# Patient Record
Sex: FEMALE | Race: BLACK OR AFRICAN AMERICAN | Employment: FULL TIME | URBAN - METROPOLITAN AREA
[De-identification: names, ages, dates, MRNs, and addresses within clinical notes are randomized per-mention and may not be internally consistent; named-entity substitution may affect disease eponyms.]

---

## 2017-01-10 ENCOUNTER — OFFICE VISIT (OUTPATIENT)
Dept: INTERNAL MEDICINE CLINIC | Age: 47
End: 2017-01-10

## 2017-01-10 VITALS
WEIGHT: 210 LBS | BODY MASS INDEX: 33.75 KG/M2 | SYSTOLIC BLOOD PRESSURE: 144 MMHG | DIASTOLIC BLOOD PRESSURE: 83 MMHG | TEMPERATURE: 98.2 F | OXYGEN SATURATION: 99 % | HEART RATE: 74 BPM | HEIGHT: 66 IN | RESPIRATION RATE: 18 BRPM

## 2017-01-10 DIAGNOSIS — I10 ESSENTIAL HYPERTENSION: ICD-10-CM

## 2017-01-10 DIAGNOSIS — M79.641 PAIN OF RIGHT HAND: Primary | ICD-10-CM

## 2017-01-10 RX ORDER — NAPROXEN 500 MG/1
500 TABLET ORAL 2 TIMES DAILY WITH MEALS
Qty: 60 TAB | Refills: 0 | Status: SHIPPED | OUTPATIENT
Start: 2017-01-10 | End: 2017-05-15

## 2017-01-10 NOTE — LETTER
1/10/2017 2:09 PM 
 
Ms. Cesar KUMAR [address] [facility] [phone] To Whom It May Concern: 
 
 
Please excuse the above patient from work January 10-11, 2017 due to acute hand injury. Please contact our office if further information is needed. Sincerely, Benito Dorman MD

## 2017-01-10 NOTE — MR AVS SNAPSHOT
Visit Information Date & Time Provider Department Dept. Phone Encounter #  
 1/10/2017  1:45 PM [address] [phone] Follow-up Instructions Return in about 3 months (around 4/10/2017), or if symptoms worsen or fail to improve, for hypertension. Upcoming Health Maintenance Date Due Pneumococcal 19-64 Highest Risk (1 of 3 - PCV13) 2/4/1989 DTaP/Tdap/Td series (1 - Tdap) 2/4/1991 INFLUENZA AGE 9 TO ADULT 8/1/2016 PAP AKA CERVICAL CYTOLOGY 11/6/2016 Allergies as of 1/10/2017  Review Complete On: 1/10/2017 By: Radha Schwartz LPN Severity Noted Reaction Type Reactions Codeine  12/12/2011    Nausea and Vomiting Current Immunizations  Never Reviewed No immunizations on file. Not reviewed this visit You Were Diagnosed With   
  
 Codes Comments Pain of right hand    -  Primary ICD-10-CM: M79.641 ICD-9-CM: 729.5 Essential hypertension     ICD-10-CM: I10 
ICD-9-CM: 401.9 Vitals BP Pulse Temp Resp Height(growth percentile) Weight(growth percentile) 144/83 74 98.2 °F (36.8 °C) (Oral) 18 5' 6\" (1.676 m) 210 lb (95.3 kg) SpO2 BMI OB Status Smoking Status 99% 33.89 kg/m2 Chemically Induced Never Smoker BMI and BSA Data Body Mass Index Body Surface Area  
 33.89 kg/m 2 2.11 m 2 Preferred Pharmacy Pharmacy Name Phone [facility], [address] [place] [address] [phone] Your Updated Medication List  
  
   
This list is accurate as of: 1/10/17  2:11 PM.  Always use your most recent med list.  
  
  
  
  
 albuterol 90 mcg/actuation inhaler Commonly known as:  VENTOLIN HFA  
inhale 1 puff by mouth BEFORE EXERCISING  
  
 calcium carbonate 600 mg (1,500 mg) tablet Commonly known as:  Malissa Doom Take 600 mg by mouth two (2) times a day. cholecalciferol (VITAMIN D3) 5,000 unit Tab tablet Commonly known as:  VITAMIN D3 Take  by mouth daily. cyclobenzaprine 10 mg tablet Commonly known as:  FLEXERIL Take 1 Tab by mouth two (2) times a day. diclofenac EC 75 mg EC tablet Commonly known as:  VOLTAREN Take 1 Tab by mouth two (2) times a day. ferrous sulfate 325 mg (65 mg iron) tablet Commonly known as:  Alvina Carbo 1qd  
  
 fluticasone 110 mcg/actuation inhaler Commonly known as:  FLOVENT HFA Take 2 Puffs by inhalation every twelve (12) hours. naproxen 500 mg tablet Commonly known as:  NAPROSYN Take 1 Tab by mouth two (2) times daily (with meals). ORTHO-NOVUM 7/7/7 (28) 0.5/0.75/1 mg- 35 mcg Tab Generic drug:  norethindrone-ethinyl estradiol Take 1 Tab by mouth. * SYNTHROID 150 mcg tablet Generic drug:  levothyroxine Take 150 mcg by mouth Daily (before breakfast). Take 1 tablet by mouth form Monday to Friday and 2 tablets by mouth on Saturdays and Sundays * SYNTHROID 175 mcg tablet Generic drug:  levothyroxine Take 175 mcg by mouth Daily (before breakfast). triamcinolone acetonide 0.1 % topical cream  
Commonly known as:  KENALOG  
use thin layer to rash neck bid  
  
 valsartan-hydroCHLOROthiazide 320-12.5 mg per tablet Commonly known as:  DIOVAN-HCT Take 1 Tab by mouth daily. * Notice: This list has 2 medication(s) that are the same as other medications prescribed for you. Read the directions carefully, and ask your doctor or other care provider to review them with you. Prescriptions Sent to Pharmacy Refills  
 naproxen (NAPROSYN) 500 mg tablet 0 Sig: Take 1 Tab by mouth two (2) times daily (with meals). Class: Normal  
 Pharmacy: [facility] [address], [address] [place] [address] #: [phone] Route: Oral  
  
Follow-up Instructions Return in about 3 months (around 4/10/2017), or if symptoms worsen or fail to improve, for hypertension. Patient Instructions Hand Pain: Care Instructions Your Care Instructions Common causes of hand pain are overuse and injuries, such as might happen during sports or home repair projects. Everyday wear and tear, especially as you get older, also can cause hand pain. Most minor hand injuries will heal on their own, and home treatment is usually all you need to do. If you have sudden and severe pain, you may need tests and treatment. Follow-up care is a key part of your treatment and safety. Be sure to make and go to all appointments, and call your doctor if you are having problems. Its also a good idea to know your test results and keep a list of the medicines you take. How can you care for yourself at home? · Take pain medicines exactly as directed. ¨ If the doctor gave you a prescription medicine for pain, take it as prescribed. ¨ If you are not taking a prescription pain medicine, ask your doctor if you can take an over-the-counter medicine. · Rest and protect your hand. Take a break from any activity that may cause pain. · Put ice or a cold pack on your hand for 10 to 20 minutes at a time. Put a thin cloth between the ice and your skin. · Prop up the sore hand on a pillow when you ice it or anytime you sit or lie down during the next 3 days. Try to keep it above the level of your heart. This will help reduce swelling. · If your doctor recommends a sling, splint, or elastic bandage to support your hand, wear it as directed. When should you call for help? Call 911 anytime you think you may need emergency care. For example, call if: 
· Your hand turns cool or pale or changes color. Call your doctor now or seek immediate medical care if: 
· You cannot move your hand. · Your hand pops, moves out of its normal position, and then returns to its normal position. · You have signs of infection, such as: 
¨ Increased pain, swelling, warmth, or redness. ¨ Red streaks leading from the sore area. ¨ Pus draining from a place on your hand. ¨ A fever. · Your hand feels numb or tingly. Watch closely for changes in your health, and be sure to contact your doctor if: 
· Your hand feels unstable when you try to use it. · You do not get better as expected. · You have any new symptoms, such as swelling. · Bruises from an injury to your hand last longer than 2 weeks. Where can you learn more? Go to http://matthew-mitch.info/. Enter R273 in the search box to learn more about \"Hand Pain: Care Instructions. \" Current as of: May 27, 2016 Content Version: 11.1 © 4499-9997 Forerun. Care instructions adapted under license by [employer] (which disclaims liability or warranty for this information). If you have questions about a medical condition or this instruction, always ask your healthcare professional. [address] any warranty or liability for your use of this information. Introducing [facility] & HEALTH SERVICES! Dear Jero Rayogza: Thank you for requesting a Pumant account. Our records indicate that you already have an active Pumant account. You can access your account anytime at [email or web address] Did you know that you can access your hospital and ER discharge instructions at any time in Pumant? You can also review all of your test results from your hospital stay or ER visit. Additional Information If you have questions, please visit the Frequently Asked Questions section of the Pumant website at [email or web address] Remember, Pumant is NOT to be used for urgent needs. For medical emergencies, dial 911. Now available from your iPhone and Android! Please provide this summary of care documentation to your next provider. Your primary care clinician is listed as Catherine Dean. If you have any questions after today's visit, please call [phone].

## 2017-01-10 NOTE — PROGRESS NOTES
ROOM # 16    Pt presents today for complaint of Right middle finger pain s/p slip and fall on black ice. Pt preferred language for health care discussion is english. Is someone accompanying this pt? no    Is the patient using any DME equipment during OV? no    Depression Screening completed. Yes    Learning Assessment completed. Yes    Abuse Screening completed. Yes    Health Maintenance reviewed and discussed per provider. Yes    Pt is due for  Pap, Tdap, Flu, PCV-13. Please order/place referral if appropriate. Advance Directive:  1. Do you have an advance directive in place? Patient Reply: No    2. If not, would you like material regarding how to put one in place? Patient Reply: No    Coordination of Care:  1. Have you been to the ER, urgent care clinic since your last visit? Hospitalized since your last visit? No    2. Have you seen or consulted any other health care providers outside of the [address] [place] [address] since your last visit? Include any pap smears or colon screening.  No

## 2017-01-10 NOTE — PROGRESS NOTES
HISTORY OF PRESENT ILLNESS  Gena Lopez is a 55 y.o. female. Fall   The history is provided by the patient. The accident occurred 6 to 12 hours ago. The fall occurred while walking. She fell from a height of 1 - 2 ft. There was no blood loss. The pain is mild. She was ambulatory at the scene. Pertinent negatives include no numbness, no nausea, no vomiting, no headaches, no loss of consciousness, no tingling and no laceration. The risk factors include none. The symptoms are aggravated by activity and use of injured limb. She has tried ice for the symptoms. The treatment provided mild relief. Finger Pain   Pertinent negatives include no chest pain, no headaches and no shortness of breath. Pt slipped on black ice while walking today. Fell back to R hand. Hyperextended thumb. Increased pain with activity such as buttoning. No swelling or bruising. Has tried ice which helped some. BP monitoring at home has been stable. Review of Systems   Eyes: Negative for blurred vision and pain. Respiratory: Negative for cough and shortness of breath. Cardiovascular: Negative for chest pain, palpitations and leg swelling. Gastrointestinal: Negative for heartburn, nausea and vomiting. Genitourinary: Negative for frequency and urgency. Musculoskeletal: Negative for joint pain and myalgias. Neurological: Negative for dizziness, tingling, sensory change, focal weakness, loss of consciousness, numbness and headaches. Psychiatric/Behavioral: Negative for depression. The patient is not nervous/anxious. Physical Exam   Constitutional: She appears well-developed and well-nourished. No distress. /83  Pulse 74  Temp 98.2 °F (36.8 °C) (Oral)   Resp 18  Ht 5' 6\" (1.676 m)  Wt 210 lb (95.3 kg)  SpO2 99%  BMI 33.89 kg/m2     Eyes: EOM are normal. Right eye exhibits no discharge. Left eye exhibits no discharge. No scleral icterus. Musculoskeletal: She exhibits no edema or tenderness.         Right wrist: She exhibits normal range of motion, no bony tenderness and no swelling. Increased pain with extension   Neurological: She is alert. She exhibits normal muscle tone. Skin: Skin is warm and dry. No laceration noted. Psychiatric: She has a normal mood and affect. ASSESSMENT and PLAN    ICD-10-CM ICD-9-CM    1. Pain of right hand M79.641 729.5    2. Essential hypertension I10 401.9    Pain related to hyperextension from fall. No obvious swelling, ecchymosis; doubt fracture. Can take BID Naprosyn (monitor BP). Provided further care instructions on AVS. RTC if sx persist/worsen.

## 2017-01-10 NOTE — PATIENT INSTRUCTIONS
Hand Pain: Care Instructions  Your Care Instructions  Common causes of hand pain are overuse and injuries, such as might happen during sports or home repair projects. Everyday wear and tear, especially as you get older, also can cause hand pain. Most minor hand injuries will heal on their own, and home treatment is usually all you need to do. If you have sudden and severe pain, you may need tests and treatment. Follow-up care is a key part of your treatment and safety. Be sure to make and go to all appointments, and call your doctor if you are having problems. Its also a good idea to know your test results and keep a list of the medicines you take. How can you care for yourself at home? · Take pain medicines exactly as directed. ¨ If the doctor gave you a prescription medicine for pain, take it as prescribed. ¨ If you are not taking a prescription pain medicine, ask your doctor if you can take an over-the-counter medicine. · Rest and protect your hand. Take a break from any activity that may cause pain. · Put ice or a cold pack on your hand for 10 to 20 minutes at a time. Put a thin cloth between the ice and your skin. · Prop up the sore hand on a pillow when you ice it or anytime you sit or lie down during the next 3 days. Try to keep it above the level of your heart. This will help reduce swelling. · If your doctor recommends a sling, splint, or elastic bandage to support your hand, wear it as directed. When should you call for help? Call 911 anytime you think you may need emergency care. For example, call if:  · Your hand turns cool or pale or changes color. Call your doctor now or seek immediate medical care if:  · You cannot move your hand. · Your hand pops, moves out of its normal position, and then returns to its normal position. · You have signs of infection, such as:  ¨ Increased pain, swelling, warmth, or redness. ¨ Red streaks leading from the sore area.   ¨ Pus draining from a place on your hand. ¨ A fever. · Your hand feels numb or tingly. Watch closely for changes in your health, and be sure to contact your doctor if:  · Your hand feels unstable when you try to use it. · You do not get better as expected. · You have any new symptoms, such as swelling. · Bruises from an injury to your hand last longer than 2 weeks. Where can you learn more? Go to http://matthew-mitch.info/. Enter R273 in the search box to learn more about \"Hand Pain: Care Instructions. \"  Current as of: May 27, 2016  Content Version: 11.1  © 0671-3357 [employer] The World. Care instructions adapted under license by [employer] (which disclaims liability or warranty for this information). If you have questions about a medical condition or this instruction, always ask your healthcare professional. [address] any warranty or liability for your use of this information.

## 2017-05-15 ENCOUNTER — OFFICE VISIT (OUTPATIENT)
Dept: INTERNAL MEDICINE CLINIC | Age: 47
End: 2017-05-15

## 2017-05-15 ENCOUNTER — HOSPITAL ENCOUNTER (OUTPATIENT)
Dept: LAB | Age: 47
Discharge: HOME | End: 2017-05-15
Payer: COMMERCIAL

## 2017-05-15 VITALS
RESPIRATION RATE: 20 BRPM | HEIGHT: 66 IN | SYSTOLIC BLOOD PRESSURE: 139 MMHG | BODY MASS INDEX: 33.68 KG/M2 | DIASTOLIC BLOOD PRESSURE: 79 MMHG | TEMPERATURE: 97.5 F | OXYGEN SATURATION: 98 % | HEART RATE: 82 BPM | WEIGHT: 209.6 LBS

## 2017-05-15 DIAGNOSIS — Z00.00 PHYSICAL EXAM: ICD-10-CM

## 2017-05-15 DIAGNOSIS — D50.9 IRON DEFICIENCY ANEMIA, UNSPECIFIED IRON DEFICIENCY ANEMIA TYPE: ICD-10-CM

## 2017-05-15 DIAGNOSIS — I10 ESSENTIAL HYPERTENSION: ICD-10-CM

## 2017-05-15 DIAGNOSIS — Z00.00 PHYSICAL EXAM: Primary | ICD-10-CM

## 2017-05-15 DIAGNOSIS — N39.41 URGE INCONTINENCE: ICD-10-CM

## 2017-05-15 LAB
ALBUMIN SERPL BCP-MCNC: 3.1 G/DL (ref 3.4–5)
ALBUMIN/GLOB SERPL: 0.9 {RATIO} (ref 0.8–1.7)
ALP SERPL-CCNC: 53 U/L (ref 45–117)
ALT SERPL-CCNC: 22 U/L (ref 13–56)
ANION GAP BLD CALC-SCNC: 9 MMOL/L (ref 3–18)
APPEARANCE UR: CLEAR
AST SERPL W P-5'-P-CCNC: 23 U/L (ref 15–37)
BASOPHILS # BLD AUTO: 0 K/UL (ref 0–0.06)
BASOPHILS # BLD: 0 % (ref 0–2)
BILIRUB SERPL-MCNC: 0.3 MG/DL (ref 0.2–1)
BILIRUB UR QL: NEGATIVE
BUN SERPL-MCNC: 6 MG/DL (ref 7–18)
BUN/CREAT SERPL: 7 (ref 12–20)
CALCIUM SERPL-MCNC: 9 MG/DL (ref 8.5–10.1)
CHLORIDE SERPL-SCNC: 104 MMOL/L (ref 100–108)
CHOLEST SERPL-MCNC: 182 MG/DL
CO2 SERPL-SCNC: 25 MMOL/L (ref 21–32)
COLOR UR: YELLOW
CREAT SERPL-MCNC: 0.89 MG/DL (ref 0.6–1.3)
DIFFERENTIAL METHOD BLD: ABNORMAL
EOSINOPHIL # BLD: 0.3 K/UL (ref 0–0.4)
EOSINOPHIL NFR BLD: 4 % (ref 0–5)
ERYTHROCYTE [DISTWIDTH] IN BLOOD BY AUTOMATED COUNT: 14.1 % (ref 11.6–14.5)
GLOBULIN SER CALC-MCNC: 3.4 G/DL (ref 2–4)
GLUCOSE SERPL-MCNC: 91 MG/DL (ref 74–99)
GLUCOSE UR STRIP.AUTO-MCNC: NEGATIVE MG/DL
HCT VFR BLD AUTO: 35.3 % (ref 35–45)
HDLC SERPL-MCNC: 110 MG/DL (ref 40–60)
HDLC SERPL: 1.7 {RATIO} (ref 0–5)
HGB BLD-MCNC: 11.4 G/DL (ref 12–16)
HGB UR QL STRIP: NEGATIVE
IRON SATN MFR SERPL: 30 %
IRON SERPL-MCNC: 113 UG/DL (ref 50–175)
KETONES UR QL STRIP.AUTO: NEGATIVE MG/DL
LDLC SERPL CALC-MCNC: 58.4 MG/DL (ref 0–100)
LEUKOCYTE ESTERASE UR QL STRIP.AUTO: NEGATIVE
LIPID PROFILE,FLP: ABNORMAL
LYMPHOCYTES # BLD AUTO: 32 % (ref 21–52)
LYMPHOCYTES # BLD: 2.3 K/UL (ref 0.9–3.6)
MCH RBC QN AUTO: 30.6 PG (ref 24–34)
MCHC RBC AUTO-ENTMCNC: 32.3 G/DL (ref 31–37)
MCV RBC AUTO: 94.6 FL (ref 74–97)
MONOCYTES # BLD: 0.6 K/UL (ref 0.05–1.2)
MONOCYTES NFR BLD AUTO: 8 % (ref 3–10)
NEUTS SEG # BLD: 4 K/UL (ref 1.8–8)
NEUTS SEG NFR BLD AUTO: 56 % (ref 40–73)
NITRITE UR QL STRIP.AUTO: NEGATIVE
PH UR STRIP: 5 [PH] (ref 5–8)
PLATELET # BLD AUTO: 342 K/UL (ref 135–420)
PMV BLD AUTO: 10.2 FL (ref 9.2–11.8)
POTASSIUM SERPL-SCNC: 4 MMOL/L (ref 3.5–5.5)
PROT SERPL-MCNC: 6.5 G/DL (ref 6.4–8.2)
PROT UR STRIP-MCNC: NEGATIVE MG/DL
RBC # BLD AUTO: 3.73 M/UL (ref 4.2–5.3)
SODIUM SERPL-SCNC: 138 MMOL/L (ref 136–145)
SP GR UR REFRACTOMETRY: 1.01 (ref 1–1.03)
TIBC SERPL-MCNC: 378 UG/DL (ref 250–450)
TRIGL SERPL-MCNC: 68 MG/DL (ref ?–150)
UROBILINOGEN UR QL STRIP.AUTO: 0.2 EU/DL (ref 0.2–1)
VLDLC SERPL CALC-MCNC: 13.6 MG/DL
WBC # BLD AUTO: 7.1 K/UL (ref 4.6–13.2)

## 2017-05-15 PROCEDURE — 83540 ASSAY OF IRON: CPT | Performed by: INTERNAL MEDICINE

## 2017-05-15 PROCEDURE — 80061 LIPID PANEL: CPT | Performed by: INTERNAL MEDICINE

## 2017-05-15 PROCEDURE — 36415 COLL VENOUS BLD VENIPUNCTURE: CPT | Performed by: INTERNAL MEDICINE

## 2017-05-15 PROCEDURE — 80053 COMPREHEN METABOLIC PANEL: CPT | Performed by: INTERNAL MEDICINE

## 2017-05-15 PROCEDURE — 81003 URINALYSIS AUTO W/O SCOPE: CPT | Performed by: INTERNAL MEDICINE

## 2017-05-15 PROCEDURE — 85025 COMPLETE CBC W/AUTO DIFF WBC: CPT | Performed by: INTERNAL MEDICINE

## 2017-05-15 RX ORDER — OXYBUTYNIN CHLORIDE 10 MG/1
10 TABLET, EXTENDED RELEASE ORAL DAILY
Qty: 30 TAB | Refills: 5 | Status: SHIPPED | OUTPATIENT
Start: 2017-05-15 | End: 2017-11-16 | Stop reason: SDUPTHER

## 2017-05-15 NOTE — MR AVS SNAPSHOT
Visit Information Date & Time Provider Department Dept. Phone Encounter #  
 5/15/2017  8:00 AM [address] [phone] [id number] Follow-up Instructions Return in about 6 months (around 11/15/2017), or if symptoms worsen or fail to improve, for hypertension. Upcoming Health Maintenance Date Due INFLUENZA AGE 9 TO ADULT 8/1/2017 Pneumococcal 19-64 Highest Risk (2 of 3 - PCV13) 5/15/2018 PAP AKA CERVICAL CYTOLOGY 3/21/2020 DTaP/Tdap/Td series (2 - Td) 5/15/2027 Allergies as of 5/15/2017  Review Complete On: 5/15/2017 By: Afia Lake MD  
  
 Severity Noted Reaction Type Reactions Codeine  12/12/2011    Nausea and Vomiting Current Immunizations  Never Reviewed No immunizations on file. Not reviewed this visit You Were Diagnosed With   
  
 Codes Comments Physical exam    -  Primary ICD-10-CM: Z00.00 ICD-9-CM: V70.9 Urge incontinence     ICD-10-CM: N39.41 
ICD-9-CM: 788.31 Essential hypertension     ICD-10-CM: I10 
ICD-9-CM: 401.9 Iron deficiency anemia, unspecified iron deficiency anemia type     ICD-10-CM: D50.9 ICD-9-CM: 280.9 Vitals BP Pulse Temp Resp Height(growth percentile) Weight(growth percentile) 139/79 (BP 1 Location: Left arm, BP Patient Position: Sitting) 82 97.5 °F (36.4 °C) (Oral) 20 5' 6\" (1.676 m) 209 lb 9.6 oz (95.1 kg) LMP SpO2 BMI OB Status Smoking Status 05/01/2017 98% 33.83 kg/m2 Having regular periods Never Smoker Vitals History BMI and BSA Data Body Mass Index Body Surface Area  
 33.83 kg/m 2 2.1 m 2 Preferred Pharmacy Pharmacy Name Phone [facility], [address] [place] [address] [phone] Your Updated Medication List  
  
   
This list is accurate as of: 5/15/17  8:26 AM.  Always use your most recent med list.  
  
  
  
  
 albuterol 90 mcg/actuation inhaler Commonly known as:  VENTOLIN HFA  
inhale 1 puff by mouth BEFORE EXERCISING  
  
 calcium carbonate 600 mg calcium (1,500 mg) tablet Commonly known as:  Marvin Guess Take 600 mg by mouth two (2) times a day. cholecalciferol (VITAMIN D3) 5,000 unit Tab tablet Commonly known as:  VITAMIN D3 Take  by mouth daily. ferrous sulfate 325 mg (65 mg iron) tablet Commonly known as:  Crystal Brooker 1qd ORTHO-NOVUM 7/7/7 (28) 0.5/0.75/1 mg- 35 mcg Tab Generic drug:  norethindrone-ethinyl estradiol Take 1 Tab by mouth. oxybutynin chloride XL 10 mg CR tablet Commonly known as:  DITROPAN XL Take 1 Tab by mouth daily. Indications: URINARY URGENCY  
  
 SYNTHROID 150 mcg tablet Generic drug:  levothyroxine Take 150 mcg by mouth Daily (before breakfast). Take 1 tablet by mouth form Monday to Friday and 2 tablets by mouth on Saturdays and Sundays  
  
 valsartan-hydroCHLOROthiazide 320-12.5 mg per tablet Commonly known as:  DIOVAN-HCT Take 1 Tab by mouth daily. Prescriptions Sent to Pharmacy Refills  
 oxybutynin chloride XL (DITROPAN XL) 10 mg CR tablet 5 Sig: Take 1 Tab by mouth daily. Indications: URINARY URGENCY Class: Normal  
 Pharmacy: [address] #: [phone] Route: Oral  
  
Follow-up Instructions Return in about 6 months (around 11/15/2017), or if symptoms worsen or fail to improve, for hypertension. Patient Instructions Urge Incontinence in Women: Care Instructions Your Care Instructions Urge incontinence occurs when the need to urinate is so strong that you cannot reach the toilet in time, even when your bladder contains only a small amount of urine. This is also called overactive bladder or unstable bladder. Some women may have no warning before they leak urine.  This condition does not cause major health problems, but it can be embarrassing and can affect a woman's self-esteem and confidence. Treatment can cure or improve your symptoms. Follow-up care is a key part of your treatment and safety. Be sure to make and go to all appointments, and call your doctor if you are having problems. It's also a good idea to know your test results and keep a list of the medicines you take. How can you care for yourself at home? · Take your medicines exactly as prescribed. Call your doctor if you think you are having a problem with your medicine. You will get more details on the specific medicines your doctor prescribes. · Limit caffeine and alcoholthey stimulate urine production. · Urinate every 2 to 4 hours during waking hours, even if you feel that you do not have to go. · Do pelvic floor (Kegel) exercises, which tighten and strengthen pelvic muscles. To do Kegel exercises: 
¨ Squeeze the same muscles you would use to stop your urine. Your belly and thighs should not move. ¨ Hold the squeeze for 3 seconds, then relax for 3 seconds. ¨ Start with 3 seconds. Then add 1 second each week until you are able to squeeze for 10 seconds. ¨ Repeat the exercise 10 to 15 times for each session. Do three or more sessions each day. · Try wearing pads that absorb the leaks. · Keep skin in the genital area dry. When should you call for help? Call your doctor now or seek immediate medical care if: 
· You have symptoms of a urinary infection. For example: ¨ You have blood or pus in your urine. ¨ You have pain in your back just below your rib cage. This is called flank pain. ¨ You have a fever, chills, or body aches. ¨ It hurts to urinate. ¨ You have groin or belly pain. Watch closely for changes in your health, and be sure to contact your doctor if: 
· You have a hard time urinating when your bladder feels full. · You feel like you need to urinate often. · Your bladder feels full even after you urinate. · Your symptoms are getting worse. Where can you learn more? Go to http://matthew-mitch.info/. Enter W203 in the search box to learn more about \"Urge Incontinence in Women: Care Instructions. \" Current as of: October 13, 2016 Content Version: 11.2 © 3035-8720 Education.com. Care instructions adapted under license by FreeGameCredits (which disclaims liability or warranty for this information). If you have questions about a medical condition or this instruction, always ask your healthcare professional. [address] any warranty or liability for your use of this information. Introducing [facility] & HEALTH SERVICES! Dear Babs Many: Thank you for requesting a Avogy account. Our records indicate that you already have an active Avogy account. You can access your account anytime at [email or web address] Did you know that you can access your hospital and ER discharge instructions at any time in Avogy? You can also review all of your test results from your hospital stay or ER visit. Additional Information If you have questions, please visit the Frequently Asked Questions section of the Avogy website at [email or web address] Remember, Avogy is NOT to be used for urgent needs. For medical emergencies, dial 911. Now available from your iPhone and Android! Please provide this summary of care documentation to your next provider. Your primary care clinician is listed as Catherine Dean. If you have any questions after today's visit, please call [phone].

## 2017-05-15 NOTE — PATIENT INSTRUCTIONS
Urge Incontinence in Women: Care Instructions  Your Care Instructions  Urge incontinence occurs when the need to urinate is so strong that you cannot reach the toilet in time, even when your bladder contains only a small amount of urine. This is also called overactive bladder or unstable bladder. Some women may have no warning before they leak urine. This condition does not cause major health problems, but it can be embarrassing and can affect a woman's self-esteem and confidence. Treatment can cure or improve your symptoms. Follow-up care is a key part of your treatment and safety. Be sure to make and go to all appointments, and call your doctor if you are having problems. It's also a good idea to know your test results and keep a list of the medicines you take. How can you care for yourself at home? · Take your medicines exactly as prescribed. Call your doctor if you think you are having a problem with your medicine. You will get more details on the specific medicines your doctor prescribes. · Limit caffeine and alcoholthey stimulate urine production. · Urinate every 2 to 4 hours during waking hours, even if you feel that you do not have to go. · Do pelvic floor (Kegel) exercises, which tighten and strengthen pelvic muscles. To do Kegel exercises:  ¨ Squeeze the same muscles you would use to stop your urine. Your belly and thighs should not move. ¨ Hold the squeeze for 3 seconds, then relax for 3 seconds. ¨ Start with 3 seconds. Then add 1 second each week until you are able to squeeze for 10 seconds. ¨ Repeat the exercise 10 to 15 times for each session. Do three or more sessions each day. · Try wearing pads that absorb the leaks. · Keep skin in the genital area dry. When should you call for help? Call your doctor now or seek immediate medical care if:  · You have symptoms of a urinary infection. For example:  ¨ You have blood or pus in your urine.   ¨ You have pain in your back just below your rib cage. This is called flank pain. ¨ You have a fever, chills, or body aches. ¨ It hurts to urinate. ¨ You have groin or belly pain. Watch closely for changes in your health, and be sure to contact your doctor if:  · You have a hard time urinating when your bladder feels full. · You feel like you need to urinate often. · Your bladder feels full even after you urinate. · Your symptoms are getting worse. Where can you learn more? Go to http://matthew-mitch.info/. Enter Q564 in the search box to learn more about \"Urge Incontinence in Women: Care Instructions. \"  Current as of: October 13, 2016  Content Version: 11.2  © 2738-3326 [employer]. Care instructions adapted under license by "[employer] (which disclaims liability or warranty for this information). If you have questions about a medical condition or this instruction, always ask your healthcare professional. [place] [address] any warranty or liability for your use of this information.

## 2017-05-15 NOTE — PROGRESS NOTES
Pt presents today for  A physical exam     Pt preferred language for health care discussion is english. Is someone accompanying this pt? no    Is the patient using any DME equipment during OV? no    Depression Screening completed. Yes    Learning Assessment completed. Yes    Health Maintenance reviewed and discussed per provider. Yes    Pt is due for Tdap, pneumococcal, patient declined immunizations. Please order/place referral if appropriate. Advance Directive:  1. Do you have an advance directive in place? Patient Reply: no      Coordination of Care:  1. Have you been to the ER, urgent care clinic since your last visit? Hospitalized since your last visit? no    2. Have you seen or consulted any other health care providers outside of the [address] [place] [address] since your last visit? Include any pap smears or colon screening.  no

## 2017-05-15 NOTE — PROGRESS NOTES
HISTORY OF PRESENT ILLNESS  Evelyn Ma is a 52 y.o. female. HPI Comments: 51 yo female here for CPE. BP stable. No CP. Occasional SOB with recent pollen, responds well to inhaler use. Recently seen by her endocrinologist regarding h/o thyroid CA. Urge UI for past few months. No leakage with cough, sneeze. Sx worsen when bladder is full. H/o anemia. Continues to take iron supplement. Review of Systems   Constitutional: Negative for chills, fever and weight loss. HENT: Negative for congestion. Eyes: Negative for blurred vision and pain. Respiratory: Negative for cough and wheezing. Cardiovascular: Negative for chest pain, palpitations and leg swelling. Gastrointestinal: Negative for blood in stool, heartburn, melena, nausea and vomiting. Genitourinary: Positive for urgency. Negative for dysuria, frequency and hematuria. Musculoskeletal: Negative for joint pain and myalgias. Skin: Negative for itching and rash. Neurological: Negative for dizziness, tingling and headaches. Psychiatric/Behavioral: Negative for depression. The patient is not nervous/anxious. 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 90 Tab 3    ORTHO-NOVUM //7, 28, 0.5/0.75/1 mg- 35 mcg tab Take 1 Tab by mouth. 1    cholecalciferol, VITAMIN D3, (VITAMIN D3) 5,000 unit tab tablet Take  by mouth daily.       calcium carbonate (CALTREX) 600 mg (1,500 mg) tablet Take 600 mg by mouth two (2) times a day.  albuterol (VENTOLIN HFA) 90 mcg/actuation inhaler inhale 1 puff by mouth BEFORE EXERCISING 2 Inhaler 5    SYNTHROID 150 mcg tablet Take 150 mcg by mouth Daily (before breakfast). Take 1 tablet by mouth form Monday to Friday and 2 tablets by mouth on Saturdays and Sundays  1    naproxen (NAPROSYN) 500 mg tablet Take 1 Tab by mouth two (2) times daily (with meals). 60 Tab 0    SYNTHROID 175 mcg tablet Take 175 mcg by mouth Daily (before breakfast).  diclofenac EC (VOLTAREN) 75 mg EC tablet Take 1 Tab by mouth two (2) times a day. 20 Tab 0    cyclobenzaprine (FLEXERIL) 10 mg tablet Take 1 Tab by mouth two (2) times a day. 20 Tab 0    triamcinolone acetonide (KENALOG) 0.1 % topical cream use thin layer to rash neck bid 15 g 1    fluticasone (FLOVENT HFA) 110 mcg/actuation inhaler Take 2 Puffs by inhalation every twelve (12) hours. 1 Inhaler 0     No current facility-administered medications on file prior to visit. Social History   Substance Use Topics    Smoking status: Never Smoker    Smokeless tobacco: Never Used    Alcohol use No     Family History   Problem Relation Age of Onset    Hypertension Mother     Hypertension Father      Physical Exam   Constitutional: She appears well-developed and well-nourished. No distress. /79 (BP 1 Location: Left arm, BP Patient Position: Sitting)  Pulse 82  Temp 97.5 °F (36.4 °C) (Oral)   Resp 20  Ht 5' 6\" (1.676 m)  Wt 209 lb 9.6 oz (95.1 kg)  LMP 05/01/2017  SpO2 98%  BMI 33.83 kg/m2     HENT:   Right Ear: External ear normal.   Left Ear: External ear normal.   Eyes: EOM are normal. Pupils are equal, round, and reactive to light. Right eye exhibits no discharge. Left eye exhibits no discharge. No scleral icterus. Neck: Neck supple. Cardiovascular: Normal rate, regular rhythm and normal heart sounds. Exam reveals no gallop and no friction rub. No murmur heard.   Pulmonary/Chest: Effort normal and breath sounds normal. No respiratory distress. She has no wheezes. She has no rales. Abdominal: Soft. Bowel sounds are normal. She exhibits no distension. There is no tenderness. There is no rebound and no guarding. Musculoskeletal: She exhibits no edema or tenderness. Lymphadenopathy:     She has no cervical adenopathy. Neurological: She is alert. She exhibits normal muscle tone. Skin: Skin is warm and dry. Psychiatric: She has a normal mood and affect. Her behavior is normal.     Lab Results   Component Value Date/Time    Sodium 132 11/09/2015 08:54 AM    Potassium 4.3 11/09/2015 08:54 AM    Chloride 95 11/09/2015 08:54 AM    CO2 24 11/09/2015 08:54 AM    Glucose 91 11/09/2015 08:54 AM    BUN 9 11/09/2015 08:54 AM    Creatinine 0.79 11/09/2015 08:54 AM    BUN/Creatinine ratio 11 11/09/2015 08:54 AM    GFR est  11/09/2015 08:54 AM    GFR est non-AA 91 11/09/2015 08:54 AM    Calcium 8.7 11/09/2015 08:54 AM    Bilirubin, total 0.4 11/09/2015 08:54 AM    AST (SGOT) 20 11/09/2015 08:54 AM    Alk. phosphatase 43 11/09/2015 08:54 AM    Protein, total 6.6 11/09/2015 08:54 AM    Albumin 3.9 11/09/2015 08:54 AM    A-G Ratio 1.4 11/09/2015 08:54 AM    ALT (SGPT) 14 11/09/2015 08:54 AM     Lab Results   Component Value Date/Time    Cholesterol, total 141 11/09/2015 08:54 AM    HDL Cholesterol 88 11/09/2015 08:54 AM    LDL, calculated 44 11/09/2015 08:54 AM    VLDL, calculated 9 11/09/2015 08:54 AM    Triglyceride 46 11/09/2015 08:54 AM     Lab Results   Component Value Date/Time    WBC 7.6 06/17/2016 07:45 AM    HGB 11.1 06/17/2016 07:45 AM    HCT 34.0 06/17/2016 07:45 AM    PLATELET 353 92/52/0897 07:45 AM    MCV 92.9 06/17/2016 07:45 AM     Lab Results   Component Value Date/Time    Hemoglobin A1c (POC) 5.8 10/21/2013 03:03 PM     ASSESSMENT and PLAN    ICD-10-CM ICD-9-CM    1.  Physical exam Z00.00 V70.9 CBC WITH AUTOMATED DIFF      METABOLIC PANEL, COMPREHENSIVE      LIPID PANEL      URINALYSIS W/ RFLX MICROSCOPIC      IRON PROFILE   2. Urge incontinence N39.41 788.31    3. Essential hypertension I10 401.9    4. Iron deficiency anemia, unspecified iron deficiency anemia type D50.9 280.9    Repeat labs today. Declines vaccines. BP stable. Discussed bladder training. Will try Ditropan for UI sx.

## 2017-09-18 RX ORDER — VALSARTAN AND HYDROCHLOROTHIAZIDE 320; 12.5 MG/1; MG/1
TABLET, FILM COATED ORAL
Qty: 90 TAB | Refills: 3 | Status: SHIPPED | OUTPATIENT
Start: 2017-09-18 | End: 2018-05-21 | Stop reason: SDUPTHER

## 2017-11-16 RX ORDER — OXYBUTYNIN CHLORIDE 10 MG/1
TABLET, EXTENDED RELEASE ORAL
Qty: 30 TAB | Refills: 5 | Status: SHIPPED | OUTPATIENT
Start: 2017-11-16 | End: 2019-12-27

## 2017-11-22 RX ORDER — LANOLIN ALCOHOL/MO/W.PET/CERES
CREAM (GRAM) TOPICAL
Qty: 60 TAB | Refills: 4 | Status: SHIPPED | OUTPATIENT
Start: 2017-11-22 | End: 2019-01-18 | Stop reason: SDUPTHER

## 2017-12-12 ENCOUNTER — OFFICE VISIT (OUTPATIENT)
Dept: INTERNAL MEDICINE CLINIC | Age: 47
End: 2017-12-12

## 2017-12-12 VITALS
HEIGHT: 66 IN | DIASTOLIC BLOOD PRESSURE: 79 MMHG | OXYGEN SATURATION: 97 % | HEART RATE: 87 BPM | RESPIRATION RATE: 18 BRPM | SYSTOLIC BLOOD PRESSURE: 139 MMHG | TEMPERATURE: 97 F | BODY MASS INDEX: 33.59 KG/M2 | WEIGHT: 209 LBS

## 2017-12-12 DIAGNOSIS — J40 BRONCHITIS: Primary | ICD-10-CM

## 2017-12-12 RX ORDER — AZITHROMYCIN 250 MG/1
TABLET, FILM COATED ORAL
Qty: 6 TAB | Refills: 0 | Status: SHIPPED | OUTPATIENT
Start: 2017-12-12 | End: 2017-12-17

## 2017-12-12 RX ORDER — HYDROCODONE POLISTIREX AND CHLORPHENIRAMINE POLISTIREX 10; 8 MG/5ML; MG/5ML
5 SUSPENSION, EXTENDED RELEASE ORAL
Qty: 115 ML | Refills: 0 | Status: SHIPPED | OUTPATIENT
Start: 2017-12-12 | End: 2018-02-19 | Stop reason: ALTCHOICE

## 2017-12-12 RX ORDER — METHYLPREDNISOLONE 4 MG/1
TABLET ORAL
Qty: 1 DOSE PACK | Refills: 0 | Status: SHIPPED | OUTPATIENT
Start: 2017-12-12 | End: 2018-02-19 | Stop reason: ALTCHOICE

## 2017-12-12 NOTE — PROGRESS NOTES
ROOM # [id number] [facility] presents today for   Chief Complaint   Patient presents with    Wheezing     Sunday - did neb treatment helped alittle    Cough     clear - x 6 day       Dabraeden Lopez preferred language for health care discussion is english/other. Is someone accompanying this pt? no    Is the patient using any DME equipment during OV? no    Depression Screening:  PHQ over the last two weeks 5/15/2017 1/10/2017 9/28/2016 5/31/2016 11/16/2015 4/29/2014   Little interest or pleasure in doing things Not at all Not at all Not at all Not at all Not at all Not at all   Feeling down, depressed or hopeless Not at all Not at all Several days Not at all Not at all Not at all   Total Score PHQ 2 0 0 1 0 0 0       Learning Assessment:  Learning Assessment 1/21/2015 11/6/2013   PRIMARY LEARNER Patient Patient   HIGHEST LEVEL OF EDUCATION - PRIMARY LEARNER  4 YEARS OF COLLEGE 4 YEARS [address] LEARNER NONE NONE   PRIMARY [address]    NEED - No   LEARNER PREFERENCE PRIMARY DEMONSTRATION DEMONSTRATION   LEARNING SPECIAL TOPICS - no   ANSWERED BY patient patient   RELATIONSHIP SELF SELF       Abuse Screening:  No flowsheet data found. Health Maintenance reviewed and discussed per provider. Yes    Gena Lopez is due for influenza (declined). Please order/place referral if appropriate. Advance Directive:  1. Do you have an advance directive in place? Patient Reply: no    2. If not, would you like material regarding how to put one in place? Patient Reply: no      Coordination of Care:  1. Have you been to the ER, urgent care clinic since your last visit? Hospitalized since your last visit? no    2. Have you seen or consulted any other health care providers outside of the [address] [place] [address] since your last visit? Include any pap smears or colon screening. no    Please see Red banners under Allergies, Med rec, Immunizations to remove outside inquires. All correct information has been verified with patient and added to chart.

## 2017-12-12 NOTE — PATIENT INSTRUCTIONS
Bronchitis: Care Instructions  Your Care Instructions    Bronchitis is inflammation of the bronchial tubes, which carry air to the lungs. The tubes swell and produce mucus, or phlegm. The mucus and inflamed bronchial tubes make you cough. You may have trouble breathing. Most cases of bronchitis are caused by viruses like those that cause colds. Antibiotics usually do not help and they may be harmful. Bronchitis usually develops rapidly and lasts about 2 to 3 weeks in otherwise healthy people. Follow-up care is a key part of your treatment and safety. Be sure to make and go to all appointments, and call your doctor if you are having problems. It's also a good idea to know your test results and keep a list of the medicines you take. How can you care for yourself at home? · Take all medicines exactly as prescribed. Call your doctor if you think you are having a problem with your medicine. · Get some extra rest.  · Take an over-the-counter pain medicine, such as acetaminophen (Tylenol), ibuprofen (Advil, Motrin), or naproxen (Aleve) to reduce fever and relieve body aches. Read and follow all instructions on the label. · Do not take two or more pain medicines at the same time unless the doctor told you to. Many pain medicines have acetaminophen, which is Tylenol. Too much acetaminophen (Tylenol) can be harmful. · Take an over-the-counter cough medicine that contains dextromethorphan to help quiet a dry, hacking cough so that you can sleep. Avoid cough medicines that have more than one active ingredient. Read and follow all instructions on the label. · Breathe moist air from a humidifier, hot shower, or sink filled with hot water. The heat and moisture will thin mucus so you can cough it out. · Do not smoke. Smoking can make bronchitis worse. If you need help quitting, talk to your doctor about stop-smoking programs and medicines. These can increase your chances of quitting for good.   When should you call for help? Call 911 anytime you think you may need emergency care. For example, call if:  ? · You have severe trouble breathing. ?Call your doctor now or seek immediate medical care if:  ? · You have new or worse trouble breathing. ? · You cough up dark brown or bloody mucus (sputum). ? · You have a new or higher fever. ? · You have a new rash. ? Watch closely for changes in your health, and be sure to contact your doctor if:  ? · You cough more deeply or more often, especially if you notice more mucus or a change in the color of your mucus. ? · You are not getting better as expected. Where can you learn more? Go to http://matthew-mitch.info/. Enter H333 in the search box to learn more about \"Bronchitis: Care Instructions. \"  Current as of: May 12, 2017  Content Version: 11.4  © 0161-8055 [employer]. Care instructions adapted under license by [employer] (which disclaims liability or warranty for this information). If you have questions about a medical condition or this instruction, always ask your healthcare professional. [address] any warranty or liability for your use of this information.

## 2017-12-12 NOTE — MR AVS SNAPSHOT
Visit Information Date & Time Provider Department Dept. Phone Encounter #  
 12/12/2017  9:00 AM [address] [phone] [id number] Follow-up Instructions Return if symptoms worsen or fail to improve. Upcoming Health Maintenance Date Due Influenza Age 5 to Adult 8/1/2017 Pneumococcal 19-64 Highest Risk (2 of 3 - PCV13) 5/15/2018 PAP AKA CERVICAL CYTOLOGY 3/21/2020 DTaP/Tdap/Td series (2 - Td) 5/15/2027 Allergies as of 12/12/2017  Review Complete On: 12/12/2017 By: Sandoval Osorio Severity Noted Reaction Type Reactions Codeine  12/12/2011    Nausea and Vomiting Current Immunizations  Never Reviewed No immunizations on file. Not reviewed this visit You Were Diagnosed With   
  
 Codes Comments Bronchitis    -  Primary ICD-10-CM: A99 ICD-9-CM: 300 Vitals BP Pulse Temp Resp Height(growth percentile) Weight(growth percentile) 139/79 (BP 1 Location: Left arm, BP Patient Position: Sitting) 87 97 °F (36.1 °C) (Oral) 18 5' 6\" (1.676 m) 209 lb (94.8 kg) SpO2 BMI OB Status Smoking Status 97% 33.73 kg/m2 Having regular periods Never Smoker Vitals History BMI and BSA Data Body Mass Index Body Surface Area  
 33.73 kg/m 2 2.1 m 2 Preferred Pharmacy Pharmacy Name Phone [facility], [address] [place] [address] [phone] Your Updated Medication List  
  
   
This list is accurate as of: 12/12/17  9:46 AM.  Always use your most recent med list.  
  
  
  
  
 albuterol 90 mcg/actuation inhaler Commonly known as:  VENTOLIN HFA  
inhale 1 puff by mouth BEFORE EXERCISING  
  
 azithromycin 250 mg tablet Commonly known as:  Bettye Laure Take 2 tablets today, then take 1 tablet daily  
  
 calcium carbonate 600 mg calcium (1,500 mg) tablet Commonly known as:  Pam Locust Take 600 mg by mouth two (2) times a day. chlorpheniramine-HYDROcodone 10-8 mg/5 mL suspension Commonly known as:  Lisa Kwok Take 5 mL by mouth every twelve (12) hours as needed for Cough. Max Daily Amount: 10 mL. cholecalciferol (VITAMIN D3) 5,000 unit Tab tablet Commonly known as:  VITAMIN D3 Take  by mouth daily. ferrous sulfate 325 mg (65 mg iron) tablet  
take 1 tablet by mouth once daily  
  
 methylPREDNISolone 4 mg tablet Commonly known as:  Reta Ryan Use as directed on package insert ORTHO-NOVUM 7/7/7 (28) 0.5/0.75/1 mg- 35 mcg Tab Generic drug:  norethindrone-ethinyl estradiol Take 1 Tab by mouth. oxybutynin chloride XL 10 mg CR tablet Commonly known as:  DITROPAN XL  
take 1 tablet by mouth once daily SYNTHROID 150 mcg tablet Generic drug:  levothyroxine Take 150 mcg by mouth Daily (before breakfast). Take 1 tablet by mouth form Monday to Friday and 2 tablets by mouth on Saturdays and Sundays  
  
 valsartan-hydroCHLOROthiazide 320-12.5 mg per tablet Commonly known as:  DIOVAN-HCT  
take 1 tablet by mouth once daily Prescriptions Printed Refills  
 chlorpheniramine-HYDROcodone (TUSSIONEX) 10-8 mg/5 mL suspension 0 Sig: Take 5 mL by mouth every twelve (12) hours as needed for Cough. Max Daily Amount: 10 mL. Class: Print Route: Oral  
  
Prescriptions Sent to Pharmacy Refills  
 methylPREDNISolone (MEDROL DOSEPACK) 4 mg tablet 0 Sig: Use as directed on package insert Class: Normal  
 Pharmacy: [address] Ph #: [phone]  
 azithromycin (ZITHROMAX) 250 mg tablet 0 Sig: Take 2 tablets today, then take 1 tablet daily Class: Normal  
 Pharmacy: [address] Ph #: [phone] Follow-up Instructions Return if symptoms worsen or fail to improve. Patient Instructions Bronchitis: Care Instructions Your Care Instructions Bronchitis is inflammation of the bronchial tubes, which carry air to the lungs. The tubes swell and produce mucus, or phlegm. The mucus and inflamed bronchial tubes make you cough. You may have trouble breathing. Most cases of bronchitis are caused by viruses like those that cause colds. Antibiotics usually do not help and they may be harmful. Bronchitis usually develops rapidly and lasts about 2 to 3 weeks in otherwise healthy people. Follow-up care is a key part of your treatment and safety. Be sure to make and go to all appointments, and call your doctor if you are having problems. It's also a good idea to know your test results and keep a list of the medicines you take. How can you care for yourself at home? · Take all medicines exactly as prescribed. Call your doctor if you think you are having a problem with your medicine. · Get some extra rest. 
· Take an over-the-counter pain medicine, such as acetaminophen (Tylenol), ibuprofen (Advil, Motrin), or naproxen (Aleve) to reduce fever and relieve body aches. Read and follow all instructions on the label. · Do not take two or more pain medicines at the same time unless the doctor told you to. Many pain medicines have acetaminophen, which is Tylenol. Too much acetaminophen (Tylenol) can be harmful. · Take an over-the-counter cough medicine that contains dextromethorphan to help quiet a dry, hacking cough so that you can sleep. Avoid cough medicines that have more than one active ingredient. Read and follow all instructions on the label. · Breathe moist air from a humidifier, hot shower, or sink filled with hot water. The heat and moisture will thin mucus so you can cough it out. · Do not smoke. Smoking can make bronchitis worse. If you need help quitting, talk to your doctor about stop-smoking programs and medicines. These can increase your chances of quitting for good. When should you call for help? Please provide this summary of care documentation to your next provider. Your primary care clinician is listed as Catherine Dean. If you have any questions after today's visit, please call [phone].

## 2017-12-12 NOTE — PROGRESS NOTES
HISTORY OF PRESENT ILLNESS  Richard Clement is a 52 y.o. female. Wheezing    The history is provided by the patient. This is a new problem. The current episode started more than 2 days ago. The problem occurs daily. The problem has been gradually worsening. Associated symptoms include cough. Pertinent negatives include no chest pain, no fever, no vomiting, no ear pain, no headaches, no sore throat (but hoarseness) and no rash. She has tried beta-agonist inhalers for the symptoms. The treatment provided mild relief. Her past medical history is significant for asthma. Cough   The history is provided by the patient. The current episode started more than 2 days ago. The problem occurs daily. The problem has been gradually worsening. Pertinent negatives include no chest pain, no headaches and no shortness of breath. Nothing aggravates the symptoms. Nothing relieves the symptoms. + sick contacts    Review of Systems   Constitutional: Negative for chills, fever and weight loss. HENT: Negative for congestion, ear pain and sore throat (but hoarseness). Eyes: Negative for blurred vision and pain. Respiratory: Positive for cough and wheezing. Negative for shortness of breath. Cardiovascular: Negative for chest pain, palpitations and leg swelling. Gastrointestinal: Negative for nausea and vomiting. Genitourinary: Negative for frequency and urgency. Musculoskeletal: Negative for joint pain and myalgias. Skin: Negative for itching and rash. Neurological: Negative for dizziness, tingling and headaches. Psychiatric/Behavioral: Negative for depression. The patient is not nervous/anxious.       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 1    cholecalciferol, VITAMIN D3, (VITAMIN D3) 5,000 unit tab tablet Take  by mouth daily.  calcium carbonate (CALTREX) 600 mg (1,500 mg) tablet Take 600 mg by mouth two (2) times a day.  albuterol (VENTOLIN HFA) 90 mcg/actuation inhaler inhale 1 puff by mouth BEFORE EXERCISING 2 Inhaler 5    SYNTHROID 150 mcg tablet Take 150 mcg by mouth Daily (before breakfast). Take 1 tablet by mouth form Monday to Friday and 2 tablets by mouth on Saturdays and Sundays  1     No current facility-administered medications on file prior to visit. Social History   Substance Use Topics    Smoking status: Never Smoker    Smokeless tobacco: Never Used    Alcohol use No     Physical Exam   Constitutional: She appears well-developed and well-nourished. No distress. /79 (BP 1 Location: Left arm, BP Patient Position: Sitting)  Pulse 87  Temp 97 °F (36.1 °C) (Oral)   Resp 18  Ht 5' 6\" (1.676 m)  Wt 209 lb (94.8 kg)  SpO2 97%  BMI 33.73 kg/m2     Eyes: EOM are normal. Right eye exhibits no discharge. Left eye exhibits no discharge. No scleral icterus. Neck: Neck supple. Cardiovascular: Normal rate, regular rhythm and normal heart sounds. Exam reveals no gallop and no friction rub. No murmur heard. Pulmonary/Chest: Effort normal. No respiratory distress. She has wheezes. She has no rales. Musculoskeletal: She exhibits no edema or tenderness. Lymphadenopathy:     She has no cervical adenopathy. Neurological: She is alert. She exhibits normal muscle tone. Skin: Skin is warm and dry. Psychiatric: She has a normal mood and affect.        ASSESSMENT and PLAN ICD-10-CM ICD-9-CM    1. Bronchitis J40 490      Will treat with medrol dose pack given wheezing. Azithromycin ordered. Tussionex prn for cough.  RTC if sx worsen/persist.

## 2018-02-19 ENCOUNTER — OFFICE VISIT (OUTPATIENT)
Dept: INTERNAL MEDICINE CLINIC | Age: 48
End: 2018-02-19

## 2018-02-19 ENCOUNTER — HOSPITAL ENCOUNTER (OUTPATIENT)
Dept: LAB | Age: 48
Discharge: HOME | End: 2018-02-19
Payer: COMMERCIAL

## 2018-02-19 VITALS
BODY MASS INDEX: 32.95 KG/M2 | OXYGEN SATURATION: 98 % | DIASTOLIC BLOOD PRESSURE: 78 MMHG | TEMPERATURE: 97 F | SYSTOLIC BLOOD PRESSURE: 140 MMHG | WEIGHT: 205 LBS | HEIGHT: 66 IN | HEART RATE: 74 BPM | RESPIRATION RATE: 16 BRPM

## 2018-02-19 DIAGNOSIS — I10 ESSENTIAL HYPERTENSION: ICD-10-CM

## 2018-02-19 DIAGNOSIS — D50.9 IRON DEFICIENCY ANEMIA, UNSPECIFIED IRON DEFICIENCY ANEMIA TYPE: ICD-10-CM

## 2018-02-19 DIAGNOSIS — N39.41 URGE INCONTINENCE: ICD-10-CM

## 2018-02-19 DIAGNOSIS — C73 CANCER OF THYROID (HCC): ICD-10-CM

## 2018-02-19 DIAGNOSIS — I10 ESSENTIAL HYPERTENSION: Primary | ICD-10-CM

## 2018-02-19 LAB
ALBUMIN SERPL-MCNC: 3.3 G/DL (ref 3.4–5)
ALBUMIN/GLOB SERPL: 1 {RATIO} (ref 0.8–1.7)
ALP SERPL-CCNC: 52 U/L (ref 45–117)
ALT SERPL-CCNC: 22 U/L (ref 13–56)
ANION GAP SERPL CALC-SCNC: 6 MMOL/L (ref 3–18)
AST SERPL-CCNC: 21 U/L (ref 15–37)
BILIRUB SERPL-MCNC: 0.1 MG/DL (ref 0.2–1)
BUN SERPL-MCNC: 7 MG/DL (ref 7–18)
BUN/CREAT SERPL: 9 (ref 12–20)
CALCIUM SERPL-MCNC: 8.8 MG/DL (ref 8.5–10.1)
CHLORIDE SERPL-SCNC: 105 MMOL/L (ref 100–108)
CHOLEST SERPL-MCNC: 148 MG/DL
CO2 SERPL-SCNC: 27 MMOL/L (ref 21–32)
CREAT SERPL-MCNC: 0.81 MG/DL (ref 0.6–1.3)
ERYTHROCYTE [DISTWIDTH] IN BLOOD BY AUTOMATED COUNT: 13.8 % (ref 11.6–14.5)
GLOBULIN SER CALC-MCNC: 3.4 G/DL (ref 2–4)
GLUCOSE SERPL-MCNC: 100 MG/DL (ref 74–99)
HCT VFR BLD AUTO: 37.4 % (ref 35–45)
HDLC SERPL-MCNC: 93 MG/DL (ref 40–60)
HDLC SERPL: 1.6 {RATIO} (ref 0–5)
HGB BLD-MCNC: 12 G/DL (ref 12–16)
IRON SATN MFR SERPL: 19 %
IRON SERPL-MCNC: 62 UG/DL (ref 50–175)
LDLC SERPL CALC-MCNC: 47.2 MG/DL (ref 0–100)
LIPID PROFILE,FLP: ABNORMAL
MCH RBC QN AUTO: 30.9 PG (ref 24–34)
MCHC RBC AUTO-ENTMCNC: 32.1 G/DL (ref 31–37)
MCV RBC AUTO: 96.4 FL (ref 74–97)
PLATELET # BLD AUTO: 302 K/UL (ref 135–420)
PMV BLD AUTO: 10.6 FL (ref 9.2–11.8)
POTASSIUM SERPL-SCNC: 4 MMOL/L (ref 3.5–5.5)
PROT SERPL-MCNC: 6.7 G/DL (ref 6.4–8.2)
RBC # BLD AUTO: 3.88 M/UL (ref 4.2–5.3)
SODIUM SERPL-SCNC: 138 MMOL/L (ref 136–145)
TIBC SERPL-MCNC: 326 UG/DL (ref 250–450)
TRIGL SERPL-MCNC: 39 MG/DL (ref ?–150)
VLDLC SERPL CALC-MCNC: 7.8 MG/DL
WBC # BLD AUTO: 6.3 K/UL (ref 4.6–13.2)

## 2018-02-19 PROCEDURE — 36415 COLL VENOUS BLD VENIPUNCTURE: CPT | Performed by: INTERNAL MEDICINE

## 2018-02-19 PROCEDURE — 85027 COMPLETE CBC AUTOMATED: CPT | Performed by: INTERNAL MEDICINE

## 2018-02-19 PROCEDURE — 83540 ASSAY OF IRON: CPT | Performed by: INTERNAL MEDICINE

## 2018-02-19 PROCEDURE — 80061 LIPID PANEL: CPT | Performed by: INTERNAL MEDICINE

## 2018-02-19 PROCEDURE — 80053 COMPREHEN METABOLIC PANEL: CPT | Performed by: INTERNAL MEDICINE

## 2018-02-19 NOTE — MR AVS SNAPSHOT
[facility] 
 
 
 [address] [facility] [address] [address] 
[phone] Patient: Elizabeth Garcia MRN: [id number] VDI:[birth year] Visit Information Date & Time Provider Department Dept. Phone Encounter #  
 2/19/2018  8:45 AM [facility] [phone] [id number] Follow-up Instructions Return in about 4 months (around 6/19/2018), or if symptoms worsen or fail to improve. Upcoming Health Maintenance Date Due Influenza Age 5 to Adult 8/1/2017 Pneumococcal 19-64 Highest Risk (2 of 3 - PCV13) 5/15/2018 PAP AKA CERVICAL CYTOLOGY 3/21/2020 DTaP/Tdap/Td series (2 - Td) 5/15/2027 Allergies as of 2/19/2018  Review Complete On: 2/19/2018 By: George Perez Severity Noted Reaction Type Reactions Codeine  12/12/2011    Nausea and Vomiting Current Immunizations  Never Reviewed No immunizations on file. Not reviewed this visit You Were Diagnosed With   
  
 Codes Comments Essential hypertension    -  Primary ICD-10-CM: I10 
ICD-9-CM: 401.9 Cancer of thyroid [facility])     ICD-10-CM: X98 ICD-9-CM: 973 Iron deficiency anemia, unspecified iron deficiency anemia type     ICD-10-CM: D50.9 ICD-9-CM: 280.9 BMI 33.0-33.9,adult     ICD-10-CM: E35.52 
ICD-9-CM: V85.33 Urge incontinence     ICD-10-CM: N39.41 
ICD-9-CM: 788.31 Vitals BP Pulse Temp Resp Height(growth percentile) Weight(growth percentile) 140/78 (BP 1 Location: Right arm, BP Patient Position: Sitting) 74 97 °F (36.1 °C) (Oral) 16 5' 6\" (1.676 m) 205 lb (93 kg) SpO2 BMI OB Status Smoking Status 98% 33.09 kg/m2 Having regular periods Never Smoker Vitals History BMI and BSA Data Body Mass Index Body Surface Area 33.09 kg/m 2 2.08 m 2 Preferred Pharmacy Pharmacy Name Phone [facility] [address], [address] [place] [address] [phone] Your Updated Medication List  
  
   
This list is accurate as of: 2/19/18  9:08 AM.  Always use your most recent med list.  
  
  
  
  
 albuterol 90 mcg/actuation inhaler Commonly known as:  VENTOLIN HFA  
inhale 1 puff by mouth BEFORE EXERCISING  
  
 calcium carbonate 600 mg calcium (1,500 mg) tablet Commonly known as:  Lender Bongo Take 600 mg by mouth two (2) times a day. cholecalciferol (VITAMIN D3) 5,000 unit Tab tablet Commonly known as:  VITAMIN D3 Take  by mouth daily. ferrous sulfate 325 mg (65 mg iron) tablet  
take 1 tablet by mouth once daily ORTHO-NOVUM 7/7/7 (28) 0.5/0.75/1 mg- 35 mcg Tab Generic drug:  norethindrone-ethinyl estradiol Take 1 Tab by mouth. oxybutynin chloride XL 10 mg CR tablet Commonly known as:  DITROPAN XL  
take 1 tablet by mouth once daily SYNTHROID 150 mcg tablet Generic drug:  levothyroxine Take 150 mcg by mouth Daily (before breakfast). Take 1 tablet by mouth form Monday to Friday and 2 tablets by mouth on Saturdays and Sundays  
  
 valsartan-hydroCHLOROthiazide 320-12.5 mg per tablet Commonly known as:  DIOVAN-HCT  
take 1 tablet by mouth once daily Follow-up Instructions Return in about 4 months (around 6/19/2018), or if symptoms worsen or fail to improve. To-Do List   
 02/19/2018 Lab:  CBC W/O DIFF   
  
 02/19/2018 Lab:  IRON PROFILE   
  
 02/19/2018 Lab:  LIPID PANEL   
  
 02/19/2018 Lab:  METABOLIC PANEL, COMPREHENSIVE Patient Instructions DASH Diet: Care Instructions Your Care Instructions The DASH diet is an eating plan that can help lower your blood pressure. DASH stands for Dietary Approaches to Stop Hypertension. Hypertension is high blood pressure. The DASH diet focuses on eating foods that are high in calcium, potassium, and magnesium. These nutrients can lower blood pressure.  The foods that are highest in these nutrients are fruits, vegetables, low-fat dairy products, nuts, seeds, and legumes. But taking calcium, potassium, and magnesium supplements instead of eating foods that are high in those nutrients does not have the same effect. The DASH diet also includes whole grains, fish, and poultry. The DASH diet is one of several lifestyle changes your doctor may recommend to lower your high blood pressure. Your doctor may also want you to decrease the amount of sodium in your diet. Lowering sodium while following the DASH diet can lower blood pressure even further than just the DASH diet alone. Follow-up care is a key part of your treatment and safety. Be sure to make and go to all appointments, and call your doctor if you are having problems. It's also a good idea to know your test results and keep a list of the medicines you take. How can you care for yourself at home? Following the DASH diet · Eat 4 to 5 servings of fruit each day. A serving is 1 medium-sized piece of fruit, ½ cup chopped or canned fruit, 1/4 cup dried fruit, or 4 ounces (½ cup) of fruit juice. Choose fruit more often than fruit juice. · Eat 4 to 5 servings of vegetables each day. A serving is 1 cup of lettuce or raw leafy vegetables, ½ cup of chopped or cooked vegetables, or 4 ounces (½ cup) of vegetable juice. Choose vegetables more often than vegetable juice. · Get 2 to 3 servings of low-fat and fat-free dairy each day. A serving is 8 ounces of milk, 1 cup of yogurt, or 1 ½ ounces of cheese. · Eat 6 to 8 servings of grains each day. A serving is 1 slice of bread, 1 ounce of dry cereal, or ½ cup of cooked rice, pasta, or cooked cereal. Try to choose whole-grain products as much as possible. · Limit lean meat, poultry, and fish to 2 servings each day. A serving is 3 ounces, about the size of a deck of cards.  
· Eat 4 to 5 servings of nuts, seeds, and legumes (cooked dried beans, lentils, and split peas) each week. A serving is 1/3 cup of nuts, 2 tablespoons of seeds, or ½ cup of cooked beans or peas. · Limit fats and oils to 2 to 3 servings each day. A serving is 1 teaspoon of vegetable oil or 2 tablespoons of salad dressing. · Limit sweets and added sugars to 5 servings or less a week. A serving is 1 tablespoon jelly or jam, ½ cup sorbet, or 1 cup of lemonade. · Eat less than 2,300 milligrams (mg) of sodium a day. If you limit your sodium to 1,500 mg a day, you can lower your blood pressure even more. Tips for success · Start small. Do not try to make dramatic changes to your diet all at once. You might feel that you are missing out on your favorite foods and then be more likely to not follow the plan. Make small changes, and stick with them. Once those changes become habit, add a few more changes. · Try some of the following: ¨ Make it a goal to eat a fruit or vegetable at every meal and at snacks. This will make it easy to get the recommended amount of fruits and vegetables each day. ¨ Try yogurt topped with fruit and nuts for a snack or healthy dessert. ¨ Add lettuce, tomato, cucumber, and onion to sandwiches. ¨ Combine a ready-made pizza crust with low-fat mozzarella cheese and lots of vegetable toppings. Try using tomatoes, squash, spinach, broccoli, carrots, cauliflower, and onions. ¨ Have a variety of cut-up vegetables with a low-fat dip as an appetizer instead of chips and dip. ¨ Sprinkle sunflower seeds or chopped almonds over salads. Or try adding chopped walnuts or almonds to cooked vegetables. ¨ Try some vegetarian meals using beans and peas. Add garbanzo or kidney beans to salads. Make burritos and tacos with mashed coelho beans or black beans. Where can you learn more? Go to http://matthew-mitch.info/. Enter C686 in the search box to learn more about \"DASH Diet: Care Instructions. \" Current as of: September 21, 2016 Content Version: 11.4 © 0297-0604 Healthwise, Incorporated. Care instructions adapted under license by [employer] (which disclaims liability or warranty for this information). If you have questions about a medical condition or this instruction, always ask your healthcare professional. [address] any warranty or liability for your use of this information. Introducing [facility]! Dear Tomas Meyers: Thank you for requesting a Snappli account. Our records indicate that you already have an active Snappli account. You can access your account anytime at [email or web address] Did you know that you can access your hospital and ER discharge instructions at any time in Snappli? You can also review all of your test results from your hospital stay or ER visit. Additional Information If you have questions, please visit the Frequently Asked Questions section of the Snappli website at [email or web address] Remember, Snappli is NOT to be used for urgent needs. For medical emergencies, dial 911. Now available from your iPhone and Android! Please provide this summary of care documentation to your next provider. Your primary care clinician is listed as Catherine Dean. If you have any questions after today's visit, please call [phone].

## 2018-02-19 NOTE — ASSESSMENT & PLAN NOTE
This condition is managed by Specialist.  Key Oncology Meds     Patient is on no Oncologic meds. Key Pain Meds     The patient is on no pain meds. Lab Results   Component Value Date/Time    WBC 7.1 05/15/2017 08:24 AM    ABS.  NEUTROPHILS 4.0 05/15/2017 08:24 AM    HGB 11.4 05/15/2017 08:24 AM    HCT 35.3 05/15/2017 08:24 AM    PLATELET 304 29/55/9680 08:24 AM    Creatinine 0.89 05/15/2017 08:24 AM    BUN 6 05/15/2017 08:24 AM    ALT (SGPT) 22 05/15/2017 08:24 AM    AST (SGOT) 23 05/15/2017 08:24 AM    Albumin 3.1 05/15/2017 08:24 AM

## 2018-02-19 NOTE — PATIENT INSTRUCTIONS

## 2018-02-19 NOTE — PROGRESS NOTES
ROOM # 18    Earnest Comes presents today for   Chief Complaint   Patient presents with    Hypertension       Earnest Comes preferred language for health care discussion is english/other. Is someone accompanying this pt? no    Is the patient using any DME equipment during OV? no    Depression Screening:  PHQ over the last two weeks 2/19/2018 5/15/2017 1/10/2017 9/28/2016 5/31/2016 11/16/2015 4/29/2014   Little interest or pleasure in doing things Not at all Not at all Not at all Not at all Not at all Not at all Not at all   Feeling down, depressed or hopeless Not at all Not at all Not at all Several days Not at all Not at all Not at all   Total Score PHQ 2 0 0 0 1 0 0 0       Learning Assessment:  Learning Assessment 1/21/2015 11/6/2013   PRIMARY LEARNER Patient Patient   HIGHEST LEVEL OF EDUCATION - PRIMARY LEARNER  4 YEARS OF COLLEGE 4 YEARS [address] LEARNER NONE NONE   PRIMARY [address]    NEED - No   LEARNER PREFERENCE PRIMARY DEMONSTRATION DEMONSTRATION   LEARNING SPECIAL TOPICS - no   ANSWERED BY patient patient   RELATIONSHIP SELF SELF       Abuse Screening:  No flowsheet data found. Health Maintenance reviewed and discussed per provider. Yes    Earnest Comes is due for influenza (declined). Please order/place referral if appropriate. Advance Directive:  1. Do you have an advance directive in place? Patient Reply: no    2. If not, would you like material regarding how to put one in place? Patient Reply: no    Coordination of Care:  1. Have you been to the ER, urgent care clinic since your last visit? Hospitalized since your last visit? no    2. Have you seen or consulted any other health care providers outside of the [address] [place] [address] since your last visit? Include any pap smears or colon screening. no    Please see Red banners under Allergies, Med rec, Immunizations to remove outside inquires.  All correct information has been verified with patient and added to chart.

## 2018-02-19 NOTE — PROGRESS NOTES
HISTORY OF PRESENT ILLNESS  Miguel Gardiner is a 50 y.o. female. HPI Comments: 49 yo female here for f/u of HTN. HTN: BP has been controlled. A little higher today after rushing to appt than usual for her. No CP, SOB. Exercises regularly. Has been doing intermittent fasting. Stays well hydrated. Has lost a few lbs since last visit. UI: stopped taking Ditropan due to cost. Was helping. Plans to resume if cost comes down. H/o thyroid CA followed by endo. (Dr. Anita Sierra)      Review of Systems   Constitutional: Negative for chills, fever and weight loss. HENT: Negative for congestion and ear pain. Eyes: Negative for blurred vision and pain. Respiratory: Negative for cough and shortness of breath. Cardiovascular: Negative for chest pain, palpitations and leg swelling. Gastrointestinal: Negative for nausea and vomiting. Genitourinary: Positive for frequency and urgency. Negative for dysuria, flank pain and hematuria. Musculoskeletal: Negative for joint pain and myalgias. Skin: Negative for itching and rash. Neurological: Negative for dizziness, tingling and headaches. Psychiatric/Behavioral: Negative for depression. The patient is not nervous/anxious. 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 1    cholecalciferol, VITAMIN D3, (VITAMIN D3) 5,000 unit tab tablet Take  by mouth daily.  calcium carbonate (CALTREX) 600 mg (1,500 mg) tablet Take 600 mg by mouth two (2) times a day.  albuterol (VENTOLIN HFA) 90 mcg/actuation inhaler inhale 1 puff by mouth BEFORE EXERCISING 2 Inhaler 5    SYNTHROID 150 mcg tablet Take 150 mcg by mouth Daily (before breakfast). Take 1 tablet by mouth form Monday to Friday and 2 tablets by mouth on Saturdays and Sundays  1     No current facility-administered medications on file prior to visit. Social History   Substance Use Topics    Smoking status: Never Smoker    Smokeless tobacco: Never Used    Alcohol use No     Physical Exam   Constitutional: She appears well-developed and well-nourished. No distress. /78 (BP 1 Location: Right arm, BP Patient Position: Sitting)  Pulse 74  Temp 97 °F (36.1 °C) (Oral)   Resp 16  Ht 5' 6\" (1.676 m)  Wt 205 lb (93 kg)  SpO2 98%  BMI 33.09 kg/m2     Eyes: EOM are normal. Right eye exhibits no discharge. Left eye exhibits no discharge. No scleral icterus. Neck: Neck supple. Cardiovascular: Normal rate, regular rhythm and normal heart sounds. Exam reveals no gallop and no friction rub. No murmur heard. Pulmonary/Chest: Effort normal and breath sounds normal. No respiratory distress. She has no wheezes. She has no rales. Abdominal: Soft. She exhibits no distension. There is no tenderness. There is no rebound and no guarding. Musculoskeletal: She exhibits no edema or tenderness. Lymphadenopathy:     She has no cervical adenopathy. Neurological: She is alert. She exhibits normal muscle tone. Skin: Skin is warm and dry. Psychiatric: She has a normal mood and affect.      Lab Results   Component Value Date/Time    Sodium 138 05/15/2017 08:24 AM    Potassium 4.0 05/15/2017 08:24 AM    Chloride 104 05/15/2017 08:24 AM    CO2 25 05/15/2017 08:24 AM    Anion gap 9 05/15/2017 08:24 AM    Glucose 91 05/15/2017 08:24 AM    BUN 6 (L) 05/15/2017 08:24 AM    Creatinine 0.89 05/15/2017 08:24 AM BUN/Creatinine ratio 7 (L) 05/15/2017 08:24 AM    GFR est AA >60 05/15/2017 08:24 AM    GFR est non-AA >60 05/15/2017 08:24 AM    Calcium 9.0 05/15/2017 08:24 AM    Bilirubin, total 0.3 05/15/2017 08:24 AM    AST (SGOT) 23 05/15/2017 08:24 AM    Alk. phosphatase 53 05/15/2017 08:24 AM    Protein, total 6.5 05/15/2017 08:24 AM    Albumin 3.1 (L) 05/15/2017 08:24 AM    Globulin 3.4 05/15/2017 08:24 AM    A-G Ratio 0.9 05/15/2017 08:24 AM    ALT (SGPT) 22 05/15/2017 08:24 AM     Lab Results   Component Value Date/Time    WBC 7.1 05/15/2017 08:24 AM    HGB 11.4 (L) 05/15/2017 08:24 AM    HCT 35.3 05/15/2017 08:24 AM    PLATELET 289 87/83/2060 08:24 AM    MCV 94.6 05/15/2017 08:24 AM     Lab Results   Component Value Date/Time    Cholesterol, total 182 05/15/2017 08:24 AM    HDL Cholesterol 110 (H) 05/15/2017 08:24 AM    LDL, calculated 58.4 05/15/2017 08:24 AM    VLDL, calculated 13.6 05/15/2017 08:24 AM    Triglyceride 68 05/15/2017 08:24 AM    CHOL/HDL Ratio 1.7 05/15/2017 08:24 AM     Lab Results   Component Value Date/Time    TSH 2.560 08/19/2014 07:56 AM       ASSESSMENT and PLAN    ICD-10-CM ICD-9-CM    1. Essential hypertension I10 401.9 LIPID PANEL      METABOLIC PANEL, COMPREHENSIVE   2. Cancer of thyroid ([facility] [address] C73 193    3. Iron deficiency anemia, unspecified iron deficiency anemia type D50.9 280.9 CBC W/O DIFF      IRON PROFILE   4. BMI 33.0-33.9,adult Z68.33 V85.33    5. Urge incontinence N39.41 788.31      Repeat labs today. Will continue with current medication for now. Continue to work on lifestyle changes, weight loss. No alternative for ditropan found on $4 [employer] pain. Diagnoses and all orders for this visit:    1. Essential hypertension  Assessment & Plan:  Stable, based on history, physical exam and review of pertinent labs, studies and medications; meds reconciled; continue current treatment plan. Orders:  -     LIPID PANEL; Future  -     METABOLIC PANEL, COMPREHENSIVE; Future    2. Cancer of thyroid [facility])  Assessment & Plan: This condition is managed by Specialist.  Key Oncology Meds     Patient is on no Oncologic meds. Key Pain Meds     The patient is on no pain meds. Lab Results   Component Value Date/Time    WBC 7.1 05/15/2017 08:24 AM    ABS. NEUTROPHILS 4.0 05/15/2017 08:24 AM    HGB 11.4 05/15/2017 08:24 AM    HCT 35.3 05/15/2017 08:24 AM    PLATELET 063 00/03/0736 08:24 AM    Creatinine 0.89 05/15/2017 08:24 AM    BUN 6 05/15/2017 08:24 AM    ALT (SGPT) 22 05/15/2017 08:24 AM    AST (SGOT) 23 05/15/2017 08:24 AM    Albumin 3.1 05/15/2017 08:24 AM         3. Iron deficiency anemia, unspecified iron deficiency anemia type  -     CBC W/O DIFF; Future  -     IRON PROFILE; Future    4. BMI 33.0-33.9,adult    5. Urge incontinence  Assessment & Plan:  Stable, based on history, physical exam and review of pertinent labs, studies and medications; meds reconciled; continue current treatment plan.

## 2018-05-21 RX ORDER — VALSARTAN AND HYDROCHLOROTHIAZIDE 320; 12.5 MG/1; MG/1
TABLET, FILM COATED ORAL
Qty: 90 TAB | Refills: 3 | Status: SHIPPED | OUTPATIENT
Start: 2018-05-21 | End: 2018-08-22 | Stop reason: RX

## 2018-05-21 NOTE — TELEPHONE ENCOUNTER
Requested Prescriptions     Pending Prescriptions Disp Refills    valsartan-hydroCHLOROthiazide (DIOVAN-HCT) 320-12.5 mg per tablet 90 Tab 3

## 2018-08-21 ENCOUNTER — DOCUMENTATION ONLY (OUTPATIENT)
Dept: INTERNAL MEDICINE CLINIC | Age: 48
End: 2018-08-21

## 2018-08-21 NOTE — PROGRESS NOTES
Pt has received valsartan-HCTZ in past. Has she contacted her pharmacy to see if her medication was involved in recall?

## 2018-08-22 ENCOUNTER — TELEPHONE (OUTPATIENT)
Dept: INTERNAL MEDICINE CLINIC | Age: 48
End: 2018-08-22

## 2018-08-22 RX ORDER — LOSARTAN POTASSIUM AND HYDROCHLOROTHIAZIDE 25; 100 MG/1; MG/1
1 TABLET ORAL DAILY
Qty: 90 TAB | Refills: 3 | Status: SHIPPED | OUTPATIENT
Start: 2018-08-22 | End: 2019-07-07 | Stop reason: SDUPTHER

## 2018-08-22 NOTE — TELEPHONE ENCOUNTER
Selam Mcclelland MD 20 hours ago (4:11 PM)                 Pt has received valsartan-HCTZ in past. Has she contacted her pharmacy to see if her medication was involved in recall? Pt contacted at home number. 2 pt identifiers confirmed. Pt verbalized her medication was not involve in the recall. Pt stated she is trying to get a refill but Mail order is telling pt that they are out of stock and that there are two other medications Losartan and pt could not remember other meddication that she could get but she is not aware of the dosing. Notified her will let MD know. No other questions at this time. Please advise.

## 2018-08-22 NOTE — TELEPHONE ENCOUNTER
Benito Dorman MD 21 hours ago (4:11 PM)                 Pt has received valsartan-HCTZ in past. Has she contacted her pharmacy to see if her medication was involved in recall?                  Documentation                     Pt contacted at home number. 2 pt identifiers confirmed. Pt verbalized her medication was not involve in the recall. Pt stated she is trying to get a refill but Mail order is telling pt that they are out of stock and that there are two other medications Losartan and pt could not remember other meddication that she could get but she is not aware of the dosing. Notified her will let MD know. No other questions at this time.      Please advise. Nurse routed to Dr. Mirian Connor.

## 2018-08-22 NOTE — TELEPHONE ENCOUNTER
I placed an ordered for losartan-HCTZ to replace her medication. She should monitor her blood pressure once she makes this switch.

## 2019-01-18 ENCOUNTER — HOSPITAL ENCOUNTER (OUTPATIENT)
Dept: LAB | Age: 49
Discharge: HOME | End: 2019-01-18
Payer: COMMERCIAL

## 2019-01-18 ENCOUNTER — OFFICE VISIT (OUTPATIENT)
Dept: INTERNAL MEDICINE CLINIC | Age: 49
End: 2019-01-18

## 2019-01-18 VITALS
BODY MASS INDEX: 30.79 KG/M2 | OXYGEN SATURATION: 100 % | SYSTOLIC BLOOD PRESSURE: 120 MMHG | HEART RATE: 80 BPM | RESPIRATION RATE: 18 BRPM | WEIGHT: 191.6 LBS | HEIGHT: 66 IN | TEMPERATURE: 95.6 F | DIASTOLIC BLOOD PRESSURE: 70 MMHG

## 2019-01-18 DIAGNOSIS — C73 CANCER OF THYROID (HCC): ICD-10-CM

## 2019-01-18 DIAGNOSIS — I10 ESSENTIAL HYPERTENSION: ICD-10-CM

## 2019-01-18 DIAGNOSIS — M54.2 NECK PAIN: ICD-10-CM

## 2019-01-18 DIAGNOSIS — D50.9 IRON DEFICIENCY ANEMIA, UNSPECIFIED IRON DEFICIENCY ANEMIA TYPE: ICD-10-CM

## 2019-01-18 DIAGNOSIS — J01.00 ACUTE NON-RECURRENT MAXILLARY SINUSITIS: ICD-10-CM

## 2019-01-18 DIAGNOSIS — I10 ESSENTIAL HYPERTENSION: Primary | ICD-10-CM

## 2019-01-18 DIAGNOSIS — E66.9 CLASS 1 OBESITY WITHOUT SERIOUS COMORBIDITY WITH BODY MASS INDEX (BMI) OF 30.0 TO 30.9 IN ADULT, UNSPECIFIED OBESITY TYPE: ICD-10-CM

## 2019-01-18 LAB
ALBUMIN SERPL-MCNC: 3 G/DL (ref 3.4–5)
ALBUMIN/GLOB SERPL: 0.8 {RATIO} (ref 0.8–1.7)
ALP SERPL-CCNC: 89 U/L (ref 45–117)
ALT SERPL-CCNC: 17 U/L (ref 13–56)
ANION GAP SERPL CALC-SCNC: 3 MMOL/L (ref 3–18)
AST SERPL-CCNC: 15 U/L (ref 15–37)
BASOPHILS # BLD: 0.1 K/UL (ref 0–0.1)
BASOPHILS NFR BLD: 1 % (ref 0–2)
BILIRUB SERPL-MCNC: 0.6 MG/DL (ref 0.2–1)
BUN SERPL-MCNC: 11 MG/DL (ref 7–18)
BUN/CREAT SERPL: 14 (ref 12–20)
CALCIUM SERPL-MCNC: 7.8 MG/DL (ref 8.5–10.1)
CHLORIDE SERPL-SCNC: 102 MMOL/L (ref 100–108)
CO2 SERPL-SCNC: 31 MMOL/L (ref 21–32)
CREAT SERPL-MCNC: 0.78 MG/DL (ref 0.6–1.3)
DIFFERENTIAL METHOD BLD: ABNORMAL
EOSINOPHIL # BLD: 0.1 K/UL (ref 0–0.4)
EOSINOPHIL NFR BLD: 1 % (ref 0–5)
ERYTHROCYTE [DISTWIDTH] IN BLOOD BY AUTOMATED COUNT: 13.4 % (ref 11.6–14.5)
EST. AVERAGE GLUCOSE BLD GHB EST-MCNC: 126 MG/DL
GLOBULIN SER CALC-MCNC: 3.7 G/DL (ref 2–4)
GLUCOSE SERPL-MCNC: 91 MG/DL (ref 74–99)
HBA1C MFR BLD: 6 % (ref 4.2–5.6)
HCT VFR BLD AUTO: 33.5 % (ref 35–45)
HGB BLD-MCNC: 11.1 G/DL (ref 12–16)
IRON SATN MFR SERPL: 30 %
IRON SERPL-MCNC: 90 UG/DL (ref 50–175)
LYMPHOCYTES # BLD: 3.3 K/UL (ref 0.9–3.6)
LYMPHOCYTES NFR BLD: 33 % (ref 21–52)
MCH RBC QN AUTO: 31.4 PG (ref 24–34)
MCHC RBC AUTO-ENTMCNC: 33.1 G/DL (ref 31–37)
MCV RBC AUTO: 94.9 FL (ref 74–97)
MONOCYTES # BLD: 1 K/UL (ref 0.05–1.2)
MONOCYTES NFR BLD: 10 % (ref 3–10)
NEUTS SEG # BLD: 5.6 K/UL (ref 1.8–8)
NEUTS SEG NFR BLD: 55 % (ref 40–73)
PLATELET # BLD AUTO: 330 K/UL (ref 135–420)
PMV BLD AUTO: 10 FL (ref 9.2–11.8)
POTASSIUM SERPL-SCNC: 3.8 MMOL/L (ref 3.5–5.5)
PROT SERPL-MCNC: 6.7 G/DL (ref 6.4–8.2)
RBC # BLD AUTO: 3.53 M/UL (ref 4.2–5.3)
RBC MORPH BLD: ABNORMAL
SODIUM SERPL-SCNC: 136 MMOL/L (ref 136–145)
T4 FREE SERPL-MCNC: 1.9 NG/DL (ref 0.7–1.5)
TIBC SERPL-MCNC: 302 UG/DL (ref 250–450)
TSH SERPL DL<=0.05 MIU/L-ACNC: 0.09 UIU/ML (ref 0.36–3.74)
WBC # BLD AUTO: 10.1 K/UL (ref 4.6–13.2)
WBC MORPH BLD: ABNORMAL

## 2019-01-18 PROCEDURE — 85025 COMPLETE CBC W/AUTO DIFF WBC: CPT

## 2019-01-18 PROCEDURE — 83540 ASSAY OF IRON: CPT

## 2019-01-18 PROCEDURE — 84443 ASSAY THYROID STIM HORMONE: CPT

## 2019-01-18 PROCEDURE — 84439 ASSAY OF FREE THYROXINE: CPT

## 2019-01-18 PROCEDURE — 83036 HEMOGLOBIN GLYCOSYLATED A1C: CPT

## 2019-01-18 PROCEDURE — 80053 COMPREHEN METABOLIC PANEL: CPT

## 2019-01-18 RX ORDER — AMOXICILLIN AND CLAVULANATE POTASSIUM 875; 125 MG/1; MG/1
1 TABLET, FILM COATED ORAL 2 TIMES DAILY
Qty: 14 TAB | Refills: 0 | Status: SHIPPED | OUTPATIENT
Start: 2019-01-18 | End: 2019-01-25

## 2019-01-18 RX ORDER — LANOLIN ALCOHOL/MO/W.PET/CERES
CREAM (GRAM) TOPICAL
Qty: 60 TAB | Refills: 5 | Status: SHIPPED | OUTPATIENT
Start: 2019-01-18 | End: 2019-12-27 | Stop reason: SDUPTHER

## 2019-01-18 NOTE — PROGRESS NOTES
ROOM # 17 Identified pt with two pt identifiers(name and ). Reviewed record in preparation for visit and have obtained necessary documentation. Chief Complaint Patient presents with  Cold Symptoms  Back Pain Mirian Aguiar preferred language for health care discussion is english/other. Is the patient using any DME equipment during OV? NO Mirian Aguiar is due for: 
Health Maintenance Due Topic  Influenza Age 5 to Adult [address] Maintenance reviewed and discussed per provider Please order/place referral if appropriate. Advance Directive: 1. Do you have an advance directive in place? Patient Reply: NO 
 
2. If not, would you like material regarding how to put one in place? NO Coordination of Care: 1. Have you been to the ER, urgent care clinic since your last visit? Hospitalized since your last visit? NO 
 
2. Have you seen or consulted any other health care providers outside of the [address] [place] [address] since your last visit? Include any pap smears or colon screening. NO Patient is accompanied by self I have received verbal consent from Mirian Aguiar to discuss any/all medical information while they are present in the room. Learning Assessment: 
Learning Assessment 2015 PRIMARY LEARNER Patient Patient HIGHEST LEVEL OF EDUCATION - PRIMARY LEARNER  4 YEARS OF COLLEGE 4 YEARS OF COLLEGE  
BARRIERS PRIMARY LEARNER NONE NONE PRIMARY LANGUAGE ENGLISH ENGLISH  NEED - No  
LEARNER PREFERENCE PRIMARY DEMONSTRATION DEMONSTRATION  
LEARNING SPECIAL TOPICS - no ANSWERED BY patient patient RELATIONSHIP SELF SELF Depression Screening: PHQ over the last two weeks 2019 2018 5/15/2017 1/10/2017 2016 2016 2015 Little interest or pleasure in doing things Not at all Not at all Not at all Not at all Not at all Not at all Not at all Feeling down, depressed, irritable, or hopeless Not at all Not at all Not at all Not at all Several days Not at all Not at all Total Score PHQ 2 0 0 0 0 1 0 0 Abuse Screening: No flowsheet data found. Fall Risk No flowsheet data found.

## 2019-01-18 NOTE — PROGRESS NOTES
HISTORY OF PRESENT ILLNESS Sudarshan Trotter is a 50 y.o. female. 51 yo female here for f/u of HTN, anemia, c/o sinus pain and neck pain. Neck pain intermittent over past few months, worse at night. States it feels \"sore' No radicular sx. Worse with flexion. Iron def anemia: Has not been taking iron supplement past few months. Feels more tired. H/o thyroid CA which is followed by endo. Reports last TFTs fine Cold sx over the past 2-3 weeks- productive cough Now feels like facial pain/ teeth hurt' Increased sinus pain with bending forward HTN - controlled. Denies CP, SOB BMI 30: has lost 14 lbs since last visit. Still doing intermittent fasting. Review of Systems Constitutional: Positive for malaise/fatigue. Negative for chills, fever and weight loss. HENT: Positive for sinus pain. Negative for congestion and ear pain. Eyes: Negative for blurred vision and pain. Respiratory: Positive for cough. Negative for shortness of breath. Cardiovascular: Negative for chest pain, palpitations and leg swelling. Gastrointestinal: Negative for nausea and vomiting. Genitourinary: Negative for frequency and urgency. Musculoskeletal: Positive for neck pain. Negative for joint pain and myalgias. Skin: Negative for itching and rash. Neurological: Negative for dizziness, tingling and headaches. Psychiatric/Behavioral: Negative for depression. The patient is not nervous/anxious. Past Medical History:  
Diagnosis Date  Abdominal pain  Anemia NEC  Cancer ([address] thyroid  Goiter diffuse, nontoxic 10/16/2012  Hypertension  Nausea & vomiting Current Outpatient Medications on File Prior to Visit Medication Sig Dispense Refill  losartan-hydroCHLOROthiazide (HYZAAR) 100-25 mg per tablet Take 1 Tab by mouth daily. Indications: hypertension 90 Tab 3  
 ORTHO-NOVUM 7/7/7, 28, 0.5/0.75/1 mg- 35 mcg tab Take 1 Tab by mouth.   1  
  cholecalciferol, VITAMIN D3, (VITAMIN D3) 5,000 unit tab tablet Take  by mouth daily.  calcium carbonate (CALTREX) 600 mg (1,500 mg) tablet Take 600 mg by mouth two (2) times a day.  albuterol (VENTOLIN HFA) 90 mcg/actuation inhaler inhale 1 puff by mouth BEFORE EXERCISING 2 Inhaler 5  
 SYNTHROID 150 mcg tablet Take 150 mcg by mouth Daily (before breakfast). Take 1 tablet by mouth form Monday to Friday and 2 tablets by mouth on Saturdays and Sundays  1  
 ferrous sulfate 325 mg (65 mg iron) tablet take 1 tablet by mouth once daily 60 Tab 4  
 oxybutynin chloride XL (DITROPAN XL) 10 mg CR tablet take 1 tablet by mouth once daily 30 Tab 5 No current facility-administered medications on file prior to visit. Social History Tobacco Use  Smoking status: Never Smoker  Smokeless tobacco: Never Used Substance Use Topics  Alcohol use: No  
 Drug use: No  
 
Physical Exam  
Constitutional: She appears well-developed and well-nourished. No distress. /70 (BP 1 Location: Left arm, BP Patient Position: Sitting)   Pulse 80   Temp 95.6 °F (35.3 °C) (Oral)   Resp 18   Ht 5' 6\" (1.676 m)   Wt 191 lb 9.6 oz (86.9 kg)   SpO2 100%   BMI 30.93 kg/m² HENT:  
Right Ear: Tympanic membrane and ear canal normal.  
Left Ear: Tympanic membrane and ear canal normal.  
Nose: Right sinus exhibits maxillary sinus tenderness. Right sinus exhibits no frontal sinus tenderness. Left sinus exhibits no maxillary sinus tenderness and no frontal sinus tenderness. Mouth/Throat: No oropharyngeal exudate or posterior oropharyngeal erythema. Eyes: EOM are normal. Right eye exhibits no discharge. Left eye exhibits no discharge. No scleral icterus. Neck: Neck supple. Cardiovascular: Normal rate, regular rhythm and normal heart sounds. Exam reveals no gallop and no friction rub. No murmur heard.  
Pulmonary/Chest: Effort normal and breath sounds normal. No respiratory distress. She has no wheezes. She has no rales. Abdominal: Soft. She exhibits no distension. There is no tenderness. Musculoskeletal: She exhibits no edema or tenderness. Lymphadenopathy:  
  She has no cervical adenopathy. Neurological: She is alert. She exhibits normal muscle tone. Skin: Skin is warm and dry. Psychiatric: She has a normal mood and affect. Lab Results Component Value Date/Time Sodium 138 02/19/2018 09:18 AM  
 Potassium 4.0 02/19/2018 09:18 AM  
 Chloride 105 02/19/2018 09:18 AM  
 CO2 27 02/19/2018 09:18 AM  
 Anion gap 6 02/19/2018 09:18 AM  
 Glucose 100 (H) 02/19/2018 09:18 AM  
 BUN 7 02/19/2018 09:18 AM  
 Creatinine 0.81 02/19/2018 09:18 AM  
 BUN/Creatinine ratio 9 (L) 02/19/2018 09:18 AM  
 GFR est AA >60 02/19/2018 09:18 AM  
 GFR est non-AA >60 02/19/2018 09:18 AM  
 Calcium 8.8 02/19/2018 09:18 AM  
 Bilirubin, total 0.1 (L) 02/19/2018 09:18 AM  
 AST (SGOT) 21 02/19/2018 09:18 AM  
 Alk. phosphatase 52 02/19/2018 09:18 AM  
 Protein, total 6.7 02/19/2018 09:18 AM  
 Albumin 3.3 (L) 02/19/2018 09:18 AM  
 Globulin 3.4 02/19/2018 09:18 AM  
 A-G Ratio 1.0 02/19/2018 09:18 AM  
 ALT (SGPT) 22 02/19/2018 09:18 AM  
 
Lab Results Component Value Date/Time WBC 6.3 02/19/2018 09:18 AM  
 HGB 12.0 02/19/2018 09:18 AM  
 HCT 37.4 02/19/2018 09:18 AM  
 PLATELET 535 62/71/0582 09:18 AM  
 MCV 96.4 02/19/2018 09:18 AM  
 
Lab Results Component Value Date/Time Cholesterol, total 148 02/19/2018 09:18 AM  
 HDL Cholesterol 93 (H) 02/19/2018 09:18 AM  
 LDL, calculated 47.2 02/19/2018 09:18 AM  
 VLDL, calculated 7.8 02/19/2018 09:18 AM  
 Triglyceride 39 02/19/2018 09:18 AM  
 CHOL/HDL Ratio 1.6 02/19/2018 09:18 AM  
 
Lab Results Component Value Date/Time TSH 2.560 08/19/2014 07:56 AM  
 T4, Free 1.10 08/19/2014 07:56 AM  
 T4, Total 9.7 08/19/2014 07:56 AM  
 
ASSESSMENT and PLAN 
  ICD-10-CM ICD-9-CM 1. Essential hypertension S75 792.8 METABOLIC PANEL, COMPREHENSIVE  
   TSH 3RD GENERATION  
   T4, FREE 2. Cancer of thyroid (HCC) C73 193 TSH 3RD GENERATION  
   T4, FREE 3. Iron deficiency anemia, unspecified iron deficiency anemia type D50.9 280.9 CBC WITH AUTOMATED DIFF  
   IRON PROFILE  
4. Acute non-recurrent maxillary sinusitis J01.00 461.0 5. Neck pain M54.2 723.1 6. BMI 30.0-30.9,adult Z68.30 V85.30 HEMOGLOBIN A1C WITH EAG  
 
BP stable. Continue with current medication. Continue to work on [employer] Will repeat labs today. Iron supplement reordered Will treat with augmentin for sinusitis. RTC if sx persist/worsen Discussed neck pain. Can try prn nsaids, applied heat. Provided info on AVS. Diagnoses and all orders for this visit: 1. Essential hypertension Assessment & Plan: 
Stable, based on history, physical exam and review of pertinent labs, studies and medications; meds reconciled; continue current treatment plan. Orders: -     METABOLIC PANEL, COMPREHENSIVE; Future 
-     TSH 3RD GENERATION; Future -     T4, FREE; Future 2. Cancer of thyroid ([facility] Assessment & Plan: This condition is managed by Specialist. 
Key Oncology Meds Patient is on no Oncologic meds. Key Pain Meds The patient is on no pain meds. Lab Results Component Value Date/Time WBC 6.3 02/19/2018 09:18 AM  
 HGB 12.0 02/19/2018 09:18 AM  
 HCT 37.4 02/19/2018 09:18 AM  
 PLATELET 482 37/80/3485 09:18 AM  
 Creatinine 0.81 02/19/2018 09:18 AM  
 BUN 7 02/19/2018 09:18 AM  
 ALT (SGPT) 22 02/19/2018 09:18 AM  
 AST (SGOT) 21 02/19/2018 09:18 AM  
 Albumin 3.3 02/19/2018 09:18 AM  
 
 
Orders: 
-     TSH 3RD GENERATION; Future -     T4, FREE; Future 3. Iron deficiency anemia, unspecified iron deficiency anemia type 
-     CBC WITH AUTOMATED DIFF; Future 
-     IRON PROFILE; Future 4. Acute non-recurrent maxillary sinusitis 5. Neck pain 6. BMI 30.0-30.9,adult -     HEMOGLOBIN A1C WITH EAG; Future 7. Class 1 obesity without serious comorbidity with body mass index (BMI) of 30.0 to 30.9 in adult, unspecified obesity type Assessment & Plan: 
Improving, based on history, physical exam and review of pertinent labs, studies and medications; meds reconciled; lifestyle modifications recommended. Other orders 
-     amoxicillin-clavulanate (AUGMENTIN) 875-125 mg per tablet; Take 1 Tab by mouth two (2) times a day for 7 days. -     ferrous sulfate 325 mg (65 mg iron) tablet; take 1 tablet by mouth once daily

## 2019-01-18 NOTE — PATIENT INSTRUCTIONS
Neck Pain: Care Instructions Your Care Instructions You can have neck pain anywhere from the bottom of your head to the top of your shoulders. It can spread to the upper back or arms. Injuries, painting a ceiling, sleeping with your neck twisted, staying in one position for too long, and many other activities can cause neck pain. Most neck pain gets better with home care. Your doctor may recommend medicine to relieve pain or relax your muscles. He or she may suggest exercise and physical therapy to increase flexibility and relieve stress. You may need to wear a special (cervical) collar to support your neck for a day or two. Follow-up care is a key part of your treatment and safety. Be sure to make and go to all appointments, and call your doctor if you are having problems. It's also a good idea to know your test results and keep a list of the medicines you take. How can you care for yourself at home? · Try using a heating pad on a low or medium setting for 15 to 20 minutes every 2 or 3 hours. Try a warm shower in place of one session with the heating pad. · You can also try an ice pack for 10 to 15 minutes every 2 to 3 hours. Put a thin cloth between the ice and your skin. · Take pain medicines exactly as directed. ? If the doctor gave you a prescription medicine for pain, take it as prescribed. ? If you are not taking a prescription pain medicine, ask your doctor if you can take an over-the-counter medicine. · If your doctor recommends a cervical collar, wear it exactly as directed. When should you call for help? Call your doctor now or seek immediate medical care if: 
  · You have new or worsening numbness in your arms, buttocks or legs.  
  · You have new or worsening weakness in your arms or legs. (This could make it hard to stand up.)  
  · You lose control of your bladder or bowels.  
 Watch closely for changes in your health, and be sure to contact your doctor if:   · Your neck pain is getting worse.  
  · You are not getting better after 1 week.  
  · You do not get better as expected. Where can you learn more? Go to http://matthew-mitch.info/. Enter 02.94.40.53.46 in the search box to learn more about \"Neck Pain: Care Instructions. \" Current as of: November 29, 2017 Content Version: 11.8 © 6955-4488 [employer]. Care instructions adapted under license by [employer] (which disclaims liability or warranty for this information). If you have questions about a medical condition or this instruction, always ask your healthcare professional. [place] [address] any warranty or liability for your use of this information. Neck Pain: Care Instructions Your Care Instructions You can have neck pain anywhere from the bottom of your head to the top of your shoulders. It can spread to the upper back or arms. Injuries, painting a ceiling, sleeping with your neck twisted, staying in one position for too long, and many other activities can cause neck pain. Most neck pain gets better with home care. Your doctor may recommend medicine to relieve pain or relax your muscles. He or she may suggest exercise and physical therapy to increase flexibility and relieve stress. You may need to wear a special (cervical) collar to support your neck for a day or two. Follow-up care is a key part of your treatment and safety. Be sure to make and go to all appointments, and call your doctor if you are having problems. It's also a good idea to know your test results and keep a list of the medicines you take. How can you care for yourself at home? · Try using a heating pad on a low or medium setting for 15 to 20 minutes every 2 or 3 hours. Try a warm shower in place of one session with the heating pad. · You can also try an ice pack for 10 to 15 minutes every 2 to 3 hours. Put a thin cloth between the ice and your skin. · Take pain medicines exactly as directed. ? If the doctor gave you a prescription medicine for pain, take it as prescribed. ? If you are not taking a prescription pain medicine, ask your doctor if you can take an over-the-counter medicine. · If your doctor recommends a cervical collar, wear it exactly as directed. When should you call for help? Call your doctor now or seek immediate medical care if: 
  · You have new or worsening numbness in your arms, buttocks or legs.  
  · You have new or worsening weakness in your arms or legs. (This could make it hard to stand up.)  
  · You lose control of your bladder or bowels.  
 Watch closely for changes in your health, and be sure to contact your doctor if: 
  · Your neck pain is getting worse.  
  · You are not getting better after 1 week.  
  · You do not get better as expected. Where can you learn more? Go to http://matthew-mitch.info/. Enter [id number] in the search box to learn more about \"Neck Pain: Care Instructions. \" Current as of: November 29, 2017 Content Version: 11.8 © 1105-6328 [employer]. Care instructions adapted under license by [employer] (which disclaims liability or warranty for this information). If you have questions about a medical condition or this instruction, always ask your healthcare professional. [address] any warranty or liability for your use of this information.

## 2019-01-18 NOTE — ASSESSMENT & PLAN NOTE
Improving, based on history, physical exam and review of pertinent labs, studies and medications; meds reconciled; lifestyle modifications recommended.

## 2019-01-18 NOTE — ASSESSMENT & PLAN NOTE
This condition is managed by Specialist. 
Key Oncology Meds Patient is on no Oncologic meds. Key Pain Meds The patient is on no pain meds. Lab Results Component Value Date/Time  WBC 6.3 02/19/2018 09:18 AM  
 HGB 12.0 02/19/2018 09:18 AM  
 HCT 37.4 02/19/2018 09:18 AM  
 PLATELET 780 37/19/9756 09:18 AM  
 Creatinine 0.81 02/19/2018 09:18 AM  
 BUN 7 02/19/2018 09:18 AM  
 ALT (SGPT) 22 02/19/2018 09:18 AM  
 AST (SGOT) 21 02/19/2018 09:18 AM  
 Albumin 3.3 02/19/2018 09:18 AM

## 2019-04-03 LAB — MAMMOGRAPHY, EXTERNAL: NORMAL

## 2019-07-07 RX ORDER — LOSARTAN POTASSIUM AND HYDROCHLOROTHIAZIDE 25; 100 MG/1; MG/1
TABLET ORAL
Qty: 90 TAB | Refills: 3 | Status: SHIPPED | OUTPATIENT
Start: 2019-07-07 | End: 2020-02-11 | Stop reason: SDUPTHER

## 2019-12-27 ENCOUNTER — OFFICE VISIT (OUTPATIENT)
Dept: FAMILY MEDICINE CLINIC | Age: 49
End: 2019-12-27

## 2019-12-27 VITALS
HEART RATE: 75 BPM | TEMPERATURE: 96.9 F | DIASTOLIC BLOOD PRESSURE: 75 MMHG | BODY MASS INDEX: 32.78 KG/M2 | WEIGHT: 204 LBS | RESPIRATION RATE: 16 BRPM | SYSTOLIC BLOOD PRESSURE: 130 MMHG | HEIGHT: 66 IN

## 2019-12-27 DIAGNOSIS — I10 ESSENTIAL HYPERTENSION WITH GOAL BLOOD PRESSURE LESS THAN 140/90: Primary | ICD-10-CM

## 2019-12-27 DIAGNOSIS — R73.03 PREDIABETES: ICD-10-CM

## 2019-12-27 DIAGNOSIS — E89.0 POSTOPERATIVE HYPOTHYROIDISM: ICD-10-CM

## 2019-12-27 DIAGNOSIS — D50.9 IRON DEFICIENCY ANEMIA, UNSPECIFIED IRON DEFICIENCY ANEMIA TYPE: ICD-10-CM

## 2019-12-27 RX ORDER — LANOLIN ALCOHOL/MO/W.PET/CERES
CREAM (GRAM) TOPICAL
Qty: 60 TAB | Refills: 5 | Status: SHIPPED | OUTPATIENT
Start: 2019-12-27 | End: 2020-01-02 | Stop reason: SDUPTHER

## 2019-12-27 RX ORDER — LANOLIN ALCOHOL/MO/W.PET/CERES
CREAM (GRAM) TOPICAL
Qty: 60 TAB | Refills: 5 | Status: SHIPPED | OUTPATIENT
Start: 2019-12-27 | End: 2019-12-27 | Stop reason: SDUPTHER

## 2019-12-27 NOTE — PROGRESS NOTES
Man Ziegler    CC: EOC    HPI:     Hypothryroidism:  -Thyroid removed in 2015 due to papillary thyroid carcinoma  -Seeing [facility] endocrinologist anually  -Taking levothyroxine as prescribed  -Denies any side effects or issues with her medication  -TSH was las checked on 2019 and level was low at 0.09      HTN:  -Taking BP medication as prescribed  -Denies any side effects or issue with BP medication  -Checks BP at home. No log brought in for review.   -Reports home BP is 120s-130s/70s  -Doing keto diet  -Currently does line dancing regularly      Prediabes:  -On no glucose lowering medication  -HGBA1c last checked on 2019 and was 6%  -Doing keto diet  -Currently does line dancing regularly      ROS: Positive items marked in RED  CON: fever, chills  Cardiovascular: palpitations, CP  Resp: SOB, cough  GI: nausea, vomiting, diarrhea  : dysuria, hematuria      Past Medical History:   Diagnosis Date    Abdominal pain     Anemia NEC     Goiter diffuse, nontoxic 10/16/2012    Hypertension     Nausea & vomiting     Papillary thyroid carcinoma ([facility] [address]        Past Surgical History:   Procedure Laterality Date    HX  SECTION      HX CHOLECYSTECTOMY N/A     HX THYROIDECTOMY  2015       Family History   Problem Relation Age of Onset    Hypertension Mother     Hypertension Father        Social History     Socioeconomic History    Marital status:      Spouse name: Not on file    Number of children: Not on file    Years of education: Not on file    Highest education level: Not on file   Tobacco Use    Smoking status: Never Smoker    Smokeless tobacco: Never Used   Substance and Sexual Activity    Alcohol use: No    Drug use: No    Sexual activity: Yes     Partners: Male       Allergies   Allergen Reactions    Codeine Nausea and Vomiting         Current Outpatient Medications:     losartan-hydroCHLOROthiazide (HYZAAR) 100-25 mg per tablet, TAKE 1 TABLET BY MOUTH  DAILY FOR HYPERTENSION, Disp: 90 Tab, Rfl: 3    ferrous sulfate 325 mg (65 mg iron) tablet, take 1 tablet by mouth once daily, Disp: 60 Tab, Rfl: 5    ORTHO-NOVUM 7/7/7, 28, 0.5/0.75/1 mg- 35 mcg tab, Take 1 Tab by mouth., Disp: , Rfl: 1    cholecalciferol, VITAMIN D3, (VITAMIN D3) 5,000 unit tab tablet, Take  by mouth daily. , Disp: , Rfl:     calcium carbonate (CALTREX) 600 mg (1,500 mg) tablet, Take 600 mg by mouth two (2) times a day., Disp: , Rfl:     SYNTHROID 150 mcg tablet, Take 150 mcg by mouth Daily (before breakfast). , Disp: , Rfl: 1    oxybutynin chloride XL (DITROPAN XL) 10 mg CR tablet, take 1 tablet by mouth once daily, Disp: 30 Tab, Rfl: 5    albuterol (VENTOLIN HFA) 90 mcg/actuation inhaler, inhale 1 puff by mouth BEFORE EXERCISING, Disp: 2 Inhaler, Rfl: 5    Physical Exam:      /75   Pulse 75   Temp 96.9 °F (36.1 °C) (Oral)   Resp 16   Ht 5' 6\" (1.676 m)   Wt 204 lb (92.5 kg)   LMP 11/27/2019   BMI 32.93 kg/m²     General: obese habitus, NAD, conversant  Eyes: sclera clear bilaterally, no discharge noted, eyelids normal in appearance  HENT: NCAT  Lungs: CTAB, normal respiratory effort and rate  CV: RRR, no MRGs  ABD: soft, non-tender, non-distended, normal bowel sounds  Skin: normal temperature, turgor, color, and texture  Psych: alert and oriented to person, place and situation, normal affect  Neuro: speech normal, moving all extremities, gait normal    Results for Danilo Butler (MRN [id number]):   Ref.  Range 1/18/2019 07:39   WBC Latest Ref Range: 4.6 - 13.2 K/uL 10.1   RBC Latest Ref Range: 4.20 - 5.30 M/uL 3.53 (L)   HGB Latest Ref Range: 12.0 - 16.0 g/dL 11.1 (L)   HCT Latest Ref Range: 35.0 - 45.0 % 33.5 (L)   MCV Latest Ref Range: 74.0 - 97.0 FL 94.9   MCH Latest Ref Range: 24.0 - 34.0 PG 31.4   MCHC Latest Ref Range: 31.0 - 37.0 g/dL 33.1   RDW Latest Ref Range: 11.6 - 14.5 % 13.4   PLATELET Latest Ref Range: 135 - 420 K/uL 330   MPV Latest Ref Range: 9.2 - 11.8 FL 10.0   NEUTROPHILS Latest Ref Range: 40 - 73 % 55   LYMPHOCYTES Latest Ref Range: 21 - 52 % 33   MONOCYTES Latest Ref Range: 3 - 10 % 10   EOSINOPHILS Latest Ref Range: 0 - 5 % 1   BASOPHILS Latest Ref Range: 0 - 2 % 1   DF Latest Units:   MANUAL   ABS. NEUTROPHILS Latest Ref Range: 1.8 - 8.0 K/UL 5.6   ABS. LYMPHOCYTES Latest Ref Range: 0.9 - 3.6 K/UL 3.3   ABS. MONOCYTES Latest Ref Range: 0.05 - 1.2 K/UL 1.0   ABS. EOSINOPHILS Latest Ref Range: 0.0 - 0.4 K/UL 0.1   ABS. BASOPHILS Latest Ref Range: 0.0 - 0.1 K/UL 0.1   RBC COMMENTS Latest Units:   MICROCYTOSIS. .. WBC COMMENTS Latest Units:   REACTIVE LYMPHS   Sodium Latest Ref Range: 136 - 145 mmol/L 136   Potassium Latest Ref Range: 3.5 - 5.5 mmol/L 3.8   Chloride Latest Ref Range: 100 - 108 mmol/L 102   CO2 Latest Ref Range: 21 - 32 mmol/L 31   Anion gap Latest Ref Range: 3.0 - 18 mmol/L 3   Glucose Latest Ref Range: 74 - 99 mg/dL 91   BUN Latest Ref Range: 7.0 - 18 MG/DL 11   Creatinine Latest Ref Range: 0.6 - 1.3 MG/DL 0.78   BUN/Creatinine ratio Latest Ref Range: 12 - 20   14   Calcium Latest Ref Range: 8.5 - 10.1 MG/DL 7.8 (L)   GFR est non-AA Latest Ref Range: >60 ml/min/1.73m2 >60   GFR est AA Latest Ref Range: >60 ml/min/1.73m2 >60   Bilirubin, total Latest Ref Range: 0.2 - 1.0 MG/DL 0.6   Protein, total Latest Ref Range: 6.4 - 8.2 g/dL 6.7   Albumin Latest Ref Range: 3.4 - 5.0 g/dL 3.0 (L)   Globulin Latest Ref Range: 2.0 - 4.0 g/dL 3.7   A-G Ratio Latest Ref Range: 0.8 - 1.7   0.8   ALT (SGPT) Latest Ref Range: 13 - 56 U/L 17   AST Latest Ref Range: 15 - 37 U/L 15   Alk.  phosphatase Latest Ref Range: 45 - 117 U/L 89   Hemoglobin A1c, (calculated) Latest Ref Range: 4.2 - 5.6 % 6.0 (H)   Est. average glucose Latest Units: mg/dL 126   Iron Latest Ref Range: 50 - 175 ug/dL 90   TIBC Latest Ref Range: 250 - 450 ug/dL 302   Iron % saturation Latest Units: % 30   T4, Free Latest Ref Range: 0.7 - 1.5 NG/DL 1.9 (H)   TSH Latest Ref Range: 0.36 - 3. 74 uIU/mL 0.09 (L)       Assessment/Plan     HTN, Well Controlled:  -BP at goal of <140/90  -Will continue current BP medication regimen  -HGBA1c, CBC, CMP, fasting lipid panel, and TSH ordered  -F/U in one month      Prediabetes:  -Will continue to manage with lifestyle changes  -HGBA1c, CBC, CMP, and fasting lipid panel ordered  -F/U in one month      Hypothyroidism:  -Levothyroxine dose likely too high based on lab work from 1/18/2019  -Will repeat TSH level  -F/U in one month      Iron Deficiency Anemia:  -Will continue prescribed iron supplement  -CBC ordered  -F/U in one month        Joan Ellington MD  12/27/2019, 9:59 AM

## 2019-12-27 NOTE — PROGRESS NOTES
1. Have you been to the ER, urgent care clinic since your last visit? Hospitalized since your last visit? No    2. Have you seen or consulted any other health care providers outside of the [address] [place] [address] since your last visit? Include any pap smears or colon screening.  No

## 2020-01-02 PROBLEM — R73.03 PREDIABETES: Status: ACTIVE | Noted: 2020-01-02

## 2020-01-02 PROBLEM — E89.0 POSTOPERATIVE HYPOTHYROIDISM: Status: ACTIVE | Noted: 2020-01-02

## 2020-01-02 RX ORDER — LANOLIN ALCOHOL/MO/W.PET/CERES
CREAM (GRAM) TOPICAL
Qty: 60 TAB | Refills: 5 | Status: SHIPPED | OUTPATIENT
Start: 2020-01-02 | End: 2020-01-31

## 2020-01-30 ENCOUNTER — HOSPITAL ENCOUNTER (OUTPATIENT)
Dept: LAB | Age: 50
Discharge: HOME | End: 2020-01-30
Payer: COMMERCIAL

## 2020-01-30 DIAGNOSIS — I10 ESSENTIAL HYPERTENSION WITH GOAL BLOOD PRESSURE LESS THAN 140/90: ICD-10-CM

## 2020-01-30 DIAGNOSIS — D50.9 IRON DEFICIENCY ANEMIA, UNSPECIFIED IRON DEFICIENCY ANEMIA TYPE: ICD-10-CM

## 2020-01-30 DIAGNOSIS — R73.03 PREDIABETES: ICD-10-CM

## 2020-01-30 DIAGNOSIS — E89.0 POSTOPERATIVE HYPOTHYROIDISM: ICD-10-CM

## 2020-01-30 LAB
ALBUMIN SERPL-MCNC: 3.4 G/DL (ref 3.4–5)
ALBUMIN/GLOB SERPL: 0.9 {RATIO} (ref 0.8–1.7)
ALP SERPL-CCNC: 59 U/L (ref 45–117)
ALT SERPL-CCNC: 18 U/L (ref 13–56)
ANION GAP SERPL CALC-SCNC: 8 MMOL/L (ref 3–18)
AST SERPL-CCNC: 17 U/L (ref 10–38)
BILIRUB SERPL-MCNC: 0.4 MG/DL (ref 0.2–1)
BUN SERPL-MCNC: 10 MG/DL (ref 7–18)
BUN/CREAT SERPL: 11 (ref 12–20)
CALCIUM SERPL-MCNC: 8.2 MG/DL (ref 8.5–10.1)
CHLORIDE SERPL-SCNC: 102 MMOL/L (ref 100–111)
CHOLEST SERPL-MCNC: 199 MG/DL
CO2 SERPL-SCNC: 26 MMOL/L (ref 21–32)
CREAT SERPL-MCNC: 0.94 MG/DL (ref 0.6–1.3)
ERYTHROCYTE [DISTWIDTH] IN BLOOD BY AUTOMATED COUNT: 14.8 % (ref 11.6–14.5)
EST. AVERAGE GLUCOSE BLD GHB EST-MCNC: 114 MG/DL
GLOBULIN SER CALC-MCNC: 3.6 G/DL (ref 2–4)
GLUCOSE SERPL-MCNC: 98 MG/DL (ref 74–99)
HBA1C MFR BLD: 5.6 % (ref 4.2–5.6)
HCT VFR BLD AUTO: 34.8 % (ref 35–45)
HDLC SERPL-MCNC: 112 MG/DL (ref 40–60)
HDLC SERPL: 1.8 {RATIO} (ref 0–5)
HGB BLD-MCNC: 11.6 G/DL (ref 12–16)
LDLC SERPL CALC-MCNC: 79 MG/DL (ref 0–100)
LIPID PROFILE,FLP: ABNORMAL
MCH RBC QN AUTO: 30.9 PG (ref 24–34)
MCHC RBC AUTO-ENTMCNC: 33.3 G/DL (ref 31–37)
MCV RBC AUTO: 92.6 FL (ref 74–97)
PLATELET # BLD AUTO: 293 K/UL (ref 135–420)
PMV BLD AUTO: 10.8 FL (ref 9.2–11.8)
POTASSIUM SERPL-SCNC: 3.5 MMOL/L (ref 3.5–5.5)
PROT SERPL-MCNC: 7 G/DL (ref 6.4–8.2)
RBC # BLD AUTO: 3.76 M/UL (ref 4.2–5.3)
SODIUM SERPL-SCNC: 136 MMOL/L (ref 136–145)
TRIGL SERPL-MCNC: 40 MG/DL (ref ?–150)
TSH SERPL DL<=0.05 MIU/L-ACNC: 0.21 UIU/ML (ref 0.36–3.74)
VLDLC SERPL CALC-MCNC: 8 MG/DL
WBC # BLD AUTO: 11.1 K/UL (ref 4.6–13.2)

## 2020-01-30 PROCEDURE — 84443 ASSAY THYROID STIM HORMONE: CPT

## 2020-01-30 PROCEDURE — 36415 COLL VENOUS BLD VENIPUNCTURE: CPT

## 2020-01-30 PROCEDURE — 85027 COMPLETE CBC AUTOMATED: CPT

## 2020-01-30 PROCEDURE — 80053 COMPREHEN METABOLIC PANEL: CPT

## 2020-01-30 PROCEDURE — 80061 LIPID PANEL: CPT

## 2020-01-30 PROCEDURE — 83036 HEMOGLOBIN GLYCOSYLATED A1C: CPT

## 2020-01-31 DIAGNOSIS — D50.9 IRON DEFICIENCY ANEMIA, UNSPECIFIED IRON DEFICIENCY ANEMIA TYPE: ICD-10-CM

## 2020-01-31 RX ORDER — LANOLIN ALCOHOL/MO/W.PET/CERES
CREAM (GRAM) TOPICAL
Qty: 60 TAB | Refills: 5 | Status: SHIPPED | OUTPATIENT
Start: 2020-01-31 | End: 2021-02-12

## 2020-02-06 ENCOUNTER — OFFICE VISIT (OUTPATIENT)
Dept: FAMILY MEDICINE CLINIC | Age: 50
End: 2020-02-06

## 2020-02-06 VITALS
TEMPERATURE: 96.9 F | HEART RATE: 87 BPM | SYSTOLIC BLOOD PRESSURE: 128 MMHG | HEIGHT: 66 IN | RESPIRATION RATE: 17 BRPM | BODY MASS INDEX: 30.53 KG/M2 | DIASTOLIC BLOOD PRESSURE: 71 MMHG | OXYGEN SATURATION: 95 % | WEIGHT: 190 LBS

## 2020-02-06 DIAGNOSIS — D50.9 IRON DEFICIENCY ANEMIA, UNSPECIFIED IRON DEFICIENCY ANEMIA TYPE: ICD-10-CM

## 2020-02-06 DIAGNOSIS — R73.03 PREDIABETES: ICD-10-CM

## 2020-02-06 DIAGNOSIS — E89.0 POSTOPERATIVE HYPOTHYROIDISM: Primary | ICD-10-CM

## 2020-02-06 DIAGNOSIS — I10 ESSENTIAL HYPERTENSION WITH GOAL BLOOD PRESSURE LESS THAN 140/90: ICD-10-CM

## 2020-02-06 RX ORDER — LEVOTHYROXINE SODIUM 137 UG/1
137 TABLET ORAL
Qty: 90 TAB | Refills: 0 | Status: SHIPPED | OUTPATIENT
Start: 2020-02-06 | End: 2020-02-11 | Stop reason: SDUPTHER

## 2020-02-06 NOTE — PROGRESS NOTES
1. Have you been to the ER, urgent care clinic since your last visit? Hospitalized since your last visit? No.     2. Have you seen or consulted any other health care providers outside of the [address] [place] [address] since your last visit? Include any pap smears or colon screening.  No.     Chief Complaint   Patient presents with    Follow Up Chronic Condition    Hypertension    Hypothyroidism    Blood sugar problem    Results     labs

## 2020-02-06 NOTE — PROGRESS NOTES
[facility]    CC: F/U for Chronic Disease Management    HPI:     HTN:  -Got requested lab work  -Taking BP medication as prescribed  -Denies any side effects or issue with BP medication  -Checks BP at home. No log brought in for review.   -Reports home BP is 120s-130s/70s  -Doing keto diet  -Currently does line dancing for 1.5 hours weekly      Hypothyroidism:  -Seeing [facility] endocrinologist annually  -Got requested lab work  -Taking levothyroxine as prescribed  -Admits to issues with racing heart beat         Prediabetes:  -Got requested lab work  -On no glucose lowering medication  -HGBA1c last checked on 2019 and was 6%  -Doing keto diet  -Currently does line dancing for 1.5 hours weekly      Iron Deficiency Anemia:  -Got requested lab work  -Taking prescribed iron supplement  -Denies any side effects or issues with supplement:        ROS: Positive items marked in RED  CON: fever, chills  Cardiovascular: palpitations, CP  Resp: SOB, cough  GI: nausea, vomiting, diarrhea  : dysuria, hematuria      Past Medical History:   Diagnosis Date    Abdominal pain     Anemia NEC     Goiter diffuse, nontoxic 10/16/2012    Hypertension     Nausea & vomiting     Papillary thyroid carcinoma ([facility] [address] 2015    Prediabetes        Past Surgical History:   Procedure Laterality Date    HX  SECTION      HX CHOLECYSTECTOMY N/A     HX THYROIDECTOMY  2015       Family History   Problem Relation Age of Onset    Hypertension Mother     Hypertension Father        Social History     Socioeconomic History    Marital status:      Spouse name: Not on file    Number of children: Not on file    Years of education: Not on file    Highest education level: Not on file   Tobacco Use    Smoking status: Never Smoker    Smokeless tobacco: Never Used   Substance and Sexual Activity    Alcohol use: No    Drug use: No    Sexual activity: Yes     Partners: Male       Allergies   Allergen Reactions    Codeine Nausea and Vomiting         Current Outpatient Medications:     ferrous sulfate (IRON) 325 mg (65 mg iron) tablet, take 1 tablet by mouth once daily, Disp: 60 Tab, Rfl: 5    losartan-hydroCHLOROthiazide (HYZAAR) 100-25 mg per tablet, TAKE 1 TABLET BY MOUTH  DAILY FOR HYPERTENSION, Disp: 90 Tab, Rfl: 3    ORTHO-NOVUM 7/7/7, 28, 0.5/0.75/1 mg- 35 mcg tab, Take 1 Tab by mouth daily. , Disp: , Rfl: 1    cholecalciferol, VITAMIN D3, (VITAMIN D3) 5,000 unit tab tablet, Take  by mouth daily. , Disp: , Rfl:     calcium carbonate (CALTREX) 600 mg (1,500 mg) tablet, Take 600 mg by mouth two (2) times a day., Disp: , Rfl:     albuterol (VENTOLIN HFA) 90 mcg/actuation inhaler, inhale 1 puff by mouth BEFORE EXERCISING, Disp: 2 Inhaler, Rfl: 5    Physical Exam:      /71   Pulse 87   Temp 96.9 °F (36.1 °C) (Oral)   Resp 17   Ht 5' 6\" (1.676 m)   Wt 190 lb (86.2 kg)   LMP 01/27/2020   SpO2 95%   BMI 30.67 kg/m²     General: obese habitus, NAD, conversant  Eyes: sclera clear bilaterally, no discharge noted, eyelids normal in appearance  HENT: NCAT  Lungs: CTAB, normal respiratory effort and rate  CV: RRR, no MRGs  ABD: soft, non-tender, non-distended, normal bowel sounds  Skin: normal temperature, turgor, color, and texture  Psych: alert and oriented to person, place and situation, normal affect  Neuro: speech normal, moving all extremities, gait normal    Results for Sung Car (MRN [id number]):   Ref.  Range 1/30/2020 09:04   WBC Latest Ref Range: 4.6 - 13.2 K/uL 11.1   RBC Latest Ref Range: 4.20 - 5.30 M/uL 3.76 (L)   HGB Latest Ref Range: 12.0 - 16.0 g/dL 11.6 (L)   HCT Latest Ref Range: 35.0 - 45.0 % 34.8 (L)   MCV Latest Ref Range: 74.0 - 97.0 FL 92.6   MCH Latest Ref Range: 24.0 - 34.0 PG 30.9   MCHC Latest Ref Range: 31.0 - 37.0 g/dL 33.3   RDW Latest Ref Range: 11.6 - 14.5 % 14.8 (H)   PLATELET Latest Ref Range: 135 - 420 K/uL 293   MPV Latest Ref Range: 9.2 - 11.8 FL 10.8 Sodium Latest Ref Range: 136 - 145 mmol/L 136   Potassium Latest Ref Range: 3.5 - 5.5 mmol/L 3.5   Chloride Latest Ref Range: 100 - 111 mmol/L 102   CO2 Latest Ref Range: 21 - 32 mmol/L 26   Anion gap Latest Ref Range: 3.0 - 18 mmol/L 8   Glucose Latest Ref Range: 74 - 99 mg/dL 98   BUN Latest Ref Range: 7.0 - 18 MG/DL 10   Creatinine Latest Ref Range: 0.6 - 1.3 MG/DL 0.94   BUN/Creatinine ratio Latest Ref Range: 12 - 20   11 (L)   Calcium Latest Ref Range: 8.5 - 10.1 MG/DL 8.2 (L)   GFR est non-AA Latest Ref Range: >60 ml/min/1.73m2 >60   GFR est AA Latest Ref Range: >60 ml/min/1.73m2 >60   Bilirubin, total Latest Ref Range: 0.2 - 1.0 MG/DL 0.4   Protein, total Latest Ref Range: 6.4 - 8.2 g/dL 7.0   Albumin Latest Ref Range: 3.4 - 5.0 g/dL 3.4   Globulin Latest Ref Range: 2.0 - 4.0 g/dL 3.6   A-G Ratio Latest Ref Range: 0.8 - 1.7   0.9   ALT (SGPT) Latest Ref Range: 13 - 56 U/L 18   AST Latest Ref Range: 10 - 38 U/L 17   Alk.  phosphatase Latest Ref Range: 45 - 117 U/L 59   Triglyceride Latest Ref Range: <150 MG/DL 40   Cholesterol, total Latest Ref Range: <200 MG/   HDL Cholesterol Latest Ref Range: 40 - 60 MG/ (H)   CHOL/HDL Ratio Latest Ref Range: 0 - 5.0   1.8   VLDL, calculated Latest Units: MG/DL 8   LDL, calculated Latest Ref Range: 0 - 100 MG/DL 79   Hemoglobin A1c, (calculated) Latest Ref Range: 4.2 - 5.6 % 5.6   Est. average glucose Latest Units: mg/dL 114   TSH Latest Ref Range: 0.36 - 3.74 uIU/mL 0.21 (L)       Assessment/Plan     HTN, Well Controlled:  -BP at goal of <140/90  -Will continue current BP medication regimen  -F/U in one month       Hypothyroidism:  -Over treated  -Will reduce Levothyroxine dose to 137 mcg  -Pan for repeat TSH level at next visit  -F/U in one month        Mild Iron Deficiency Anemia, Stable:  -Will continue current iron supplement  -F/U in one month      Prediabetes, Resolved:  -HGBA1c no longer in prediabetic range  encouraged to continue following recommended lifestyle changes  -F/U in one month           Mandie Campos MD  2/6/2020, 8:51 AM

## 2020-02-11 DIAGNOSIS — E89.0 POSTOPERATIVE HYPOTHYROIDISM: ICD-10-CM

## 2020-02-11 RX ORDER — LEVOTHYROXINE SODIUM 137 UG/1
137 TABLET ORAL
Qty: 90 TAB | Refills: 0 | Status: CANCELLED | OUTPATIENT
Start: 2020-02-11

## 2020-02-11 RX ORDER — LOSARTAN POTASSIUM AND HYDROCHLOROTHIAZIDE 25; 100 MG/1; MG/1
TABLET ORAL
Qty: 90 TAB | Refills: 1 | Status: SHIPPED | OUTPATIENT
Start: 2020-02-11 | End: 2020-07-01

## 2020-02-11 RX ORDER — LEVOTHYROXINE SODIUM 137 UG/1
137 TABLET ORAL
Qty: 90 TAB | Refills: 0 | Status: SHIPPED | OUTPATIENT
Start: 2020-02-11 | End: 2020-05-05

## 2020-02-11 NOTE — TELEPHONE ENCOUNTER
Requested Prescriptions     Pending Prescriptions Disp Refills    losartan-hydroCHLOROthiazide (HYZAAR) 100-25 mg per tablet 90 Tab 3    levothyroxine (SYNTHROID) 137 mcg tablet 90 Tab 0     Sig: Take 137 mcg by mouth Daily (before breakfast).  Indications: a condition with low thyroid hormone levels

## 2020-05-27 DIAGNOSIS — E89.0 POSTOPERATIVE HYPOTHYROIDISM: ICD-10-CM

## 2020-05-27 RX ORDER — LEVOTHYROXINE SODIUM 137 UG/1
TABLET ORAL
Qty: 30 TAB | Refills: 1 | Status: SHIPPED | OUTPATIENT
Start: 2020-05-27 | End: 2020-06-25

## 2020-06-23 DIAGNOSIS — E89.0 POSTOPERATIVE HYPOTHYROIDISM: ICD-10-CM

## 2020-06-25 RX ORDER — LEVOTHYROXINE SODIUM 137 UG/1
TABLET ORAL
Qty: 30 TAB | Refills: 1 | Status: SHIPPED | OUTPATIENT
Start: 2020-06-25 | End: 2020-10-14 | Stop reason: SDUPTHER

## 2020-07-15 ENCOUNTER — HOSPITAL ENCOUNTER (OUTPATIENT)
Dept: LAB | Age: 50
Discharge: HOME | End: 2020-07-15
Payer: COMMERCIAL

## 2020-07-15 DIAGNOSIS — E89.0 POSTOPERATIVE HYPOTHYROIDISM: ICD-10-CM

## 2020-07-15 DIAGNOSIS — E89.0 POSTOPERATIVE HYPOTHYROIDISM: Primary | ICD-10-CM

## 2020-07-15 LAB — TSH SERPL DL<=0.05 MIU/L-ACNC: 0.88 UIU/ML (ref 0.36–3.74)

## 2020-07-15 PROCEDURE — 84443 ASSAY THYROID STIM HORMONE: CPT

## 2020-07-15 PROCEDURE — 36415 COLL VENOUS BLD VENIPUNCTURE: CPT

## 2020-07-23 ENCOUNTER — VIRTUAL VISIT (OUTPATIENT)
Dept: FAMILY MEDICINE CLINIC | Age: 50
End: 2020-07-23

## 2020-07-23 DIAGNOSIS — Z12.11 COLON CANCER SCREENING: Primary | ICD-10-CM

## 2020-07-23 DIAGNOSIS — I10 ESSENTIAL HYPERTENSION WITH GOAL BLOOD PRESSURE LESS THAN 140/90: ICD-10-CM

## 2020-07-23 DIAGNOSIS — E89.0 POSTOPERATIVE HYPOTHYROIDISM: ICD-10-CM

## 2020-07-23 NOTE — PROGRESS NOTES
1. Have you been to the ER, urgent care clinic since your last visit? Hospitalized since your last visit? No    2. Have you seen or consulted any other health care providers outside of the [address] [place] [address] since your last visit? Include any pap smears or colon screening.  No

## 2020-07-23 NOTE — PROGRESS NOTES
[facility]    CC: F/U on Hypothyroidisim    HPI:     Mir Faria, who was evaluated through a synchronous (real-time) audio-video encounter, and/or her healthcare decision maker, is aware that it is a billable service, with coverage as determined by her insurance carrier. She provided verbal consent to proceed: Yes, and patient identification was verified. It was conducted pursuant to the emergency declaration under the [address] [place] [address] and the [employer] and [employer] Act. A caregiver was present when appropriate. Ability to conduct physical exam was limited. I was in the office. The patient was at home.       Hypothyroidism:  -Got requested lab work  -Taking levothyroxine as prescribed  -Denies any known side effects or issues with the medication      ROS: Positive items marked in RED  CON: fever, chills  Cardiovascular: palpitations, CP  Resp: SOB, cough  GI: nausea, vomiting, diarrhea  : dysuria, hematuria      Past Medical History:   Diagnosis Date    Abdominal pain     Anemia NEC     Goiter diffuse, nontoxic 10/16/2012    Hypertension     Nausea & vomiting     Papillary thyroid carcinoma ([facility] [address] 2015    Prediabetes        Past Surgical History:   Procedure Laterality Date    HX  SECTION      HX CHOLECYSTECTOMY N/A     HX THYROIDECTOMY  2015       Family History   Problem Relation Age of Onset    Hypertension Mother     Hypertension Father        Social History     Tobacco Use    Smoking status: Never Smoker    Smokeless tobacco: Never Used   Substance Use Topics    Alcohol use: No    Drug use: No       Allergies   Allergen Reactions    Codeine Nausea and Vomiting         Current Outpatient Medications:     losartan-hydroCHLOROthiazide (HYZAAR) 100-25 mg per tablet, TAKE 1 TABLET BY MOUTH  DAILY FOR HYPERTENSION, Disp: 90 Tab, Rfl: 0    levothyroxine (Synthroid) 137 mcg tablet, TAKE 1 TABLET BY MOUTH  DAILY FOR THYROID, Disp: 30 Tab, Rfl: 1    ferrous sulfate (IRON) 325 mg (65 mg iron) tablet, take 1 tablet by mouth once daily, Disp: 60 Tab, Rfl: 5    ORTHO-NOVUM 7/7/7, 28, 0.5/0.75/1 mg- 35 mcg tab, Take 1 Tab by mouth daily. , Disp: , Rfl: 1    cholecalciferol, VITAMIN D3, (VITAMIN D3) 5,000 unit tab tablet, Take  by mouth daily. , Disp: , Rfl:     calcium carbonate (CALTREX) 600 mg (1,500 mg) tablet, Take 600 mg by mouth two (2) times a day., Disp: , Rfl:     albuterol (VENTOLIN HFA) 90 mcg/actuation inhaler, inhale 1 puff by mouth BEFORE EXERCISING, Disp: 2 Inhaler, Rfl: 5      Physical Exam:        General: obese habitus, NAD, conversant  Eyes: sclera clear bilaterally, no discharge noted, eyelids normal in appearance, EOMI  HENT: NCAT  Neck: no masses visualized, trachea appears to be midline  Lungs: normal respiratory effort and rate, No visualized signs of difficulty breathing or respiratory distress  MS: normal AROM of neck noted  Skin: no significant exanthematous lesions or discoloration noted on neck/facial skin    Psych: alert and oriented to person, place and situation, normal affect  Neuro: speech normal, moving all upper extremities, no gaze palsy, no facial asymmetry (Cranial nerve 7 motor function) (limited exam due to video visit)      Assessment/Plan     Hypothyroidism, well controlled:  -Will continue current levothyroxine regimen  -TSH ordered to be checked prior to next visit  -Follow-up in 6 months for chronic disease management      Evin Duvall is a 48 y.o. female being evaluated by a Virtual Visit (video visit) encounter to address concerns as mentioned above. A caregiver was present when appropriate. Due to this being a TeleHealth encounter (During OEV-24 public health emergency), evaluation of the following organ systems was limited: Vitals/Constitutional/EENT/Resp/CV/GI//MS/Neuro/Skin/Heme-Lymph-Imm.   Pursuant to the emergency declaration under the [facility] [address] authority and the [employer] and [employer] Act, this Virtual Visit was conducted with patient's (and/or legal guardian's) consent, to reduce the risk of exposure to COVID-19 and provide necessary medical care. Services were provided through a video synchronous discussion virtually to substitute for in-person encounter. --Sofi Baig MD on 7/23/2020 at 10:49 AM    An electronic signature was used to authenticate this note.

## 2020-10-14 DIAGNOSIS — E89.0 POSTOPERATIVE HYPOTHYROIDISM: ICD-10-CM

## 2020-10-16 RX ORDER — LEVOTHYROXINE SODIUM 137 UG/1
TABLET ORAL
Qty: 90 TAB | Refills: 1 | Status: SHIPPED | OUTPATIENT
Start: 2020-10-16 | End: 2021-03-02 | Stop reason: SDUPTHER

## 2020-10-16 RX ORDER — LOSARTAN POTASSIUM AND HYDROCHLOROTHIAZIDE 25; 100 MG/1; MG/1
TABLET ORAL
Qty: 90 TAB | Refills: 1 | Status: SHIPPED | OUTPATIENT
Start: 2020-10-16 | End: 2021-02-26 | Stop reason: SDUPTHER

## 2021-01-28 DIAGNOSIS — D50.9 IRON DEFICIENCY ANEMIA, UNSPECIFIED IRON DEFICIENCY ANEMIA TYPE: ICD-10-CM

## 2021-02-12 RX ORDER — LANOLIN ALCOHOL/MO/W.PET/CERES
CREAM (GRAM) TOPICAL
Qty: 60 TAB | Refills: 5 | Status: SHIPPED | OUTPATIENT
Start: 2021-02-12 | End: 2022-03-09 | Stop reason: SDUPTHER

## 2021-02-27 RX ORDER — LOSARTAN POTASSIUM AND HYDROCHLOROTHIAZIDE 25; 100 MG/1; MG/1
TABLET ORAL
Qty: 90 TAB | Refills: 1 | Status: SHIPPED | OUTPATIENT
Start: 2021-02-27 | End: 2021-08-11

## 2021-03-02 DIAGNOSIS — E89.0 POSTOPERATIVE HYPOTHYROIDISM: ICD-10-CM

## 2021-03-02 RX ORDER — LEVOTHYROXINE SODIUM 137 UG/1
TABLET ORAL
Qty: 90 TAB | Refills: 0 | Status: SHIPPED | OUTPATIENT
Start: 2021-03-02 | End: 2021-06-03 | Stop reason: DRUGHIGH

## 2021-03-02 NOTE — TELEPHONE ENCOUNTER
Former patient of Dr. Eugenie Walters. Appointment to establish care with NP Fozia Maldonado is 3-9-21. Patient uses mail order. Please give courtesy refill.

## 2021-05-14 DIAGNOSIS — E89.0 POSTOPERATIVE HYPOTHYROIDISM: ICD-10-CM

## 2021-05-14 DIAGNOSIS — J45.40 MODERATE PERSISTENT ASTHMA WITHOUT COMPLICATION: Primary | ICD-10-CM

## 2021-05-18 RX ORDER — ALBUTEROL SULFATE 90 UG/1
AEROSOL, METERED RESPIRATORY (INHALATION)
Qty: 1 INHALER | Refills: 3 | Status: SHIPPED | OUTPATIENT
Start: 2021-05-18 | End: 2022-06-06

## 2021-05-18 RX ORDER — LEVOTHYROXINE SODIUM 137 UG/1
TABLET ORAL
Qty: 30 TAB | Refills: 0 | OUTPATIENT
Start: 2021-05-18

## 2021-05-18 RX ORDER — FLUTICASONE PROPIONATE 110 UG/1
2 AEROSOL, METERED RESPIRATORY (INHALATION) EVERY 12 HOURS
Qty: 1 INHALER | Refills: 3 | Status: SHIPPED | OUTPATIENT
Start: 2021-05-18 | End: 2021-09-13

## 2021-05-18 NOTE — TELEPHONE ENCOUNTER
Returned call. States she asked for the flovent to be discontinued because she was not using it, however, the last couple of months she has started to use an old inhaler, she checked the expiration date, she has been using two puffs once a day and the albuterol a couple weeks ago she needed it before every time she exercises and now just a couple of times a week. This all started around march.

## 2021-05-18 NOTE — TELEPHONE ENCOUNTER
Lyn Mcmahan  Patient Medical Advice Request Pool 11 hours ago (8:30 PM)        Hi. I have been in need of using my Flovent and Albuterol inhalers lately and I need to have them refilled [facility] at [facility]. The Flovent I take 2 puffs daily and the Albuterol I take as needed with exercise. Thank you so much for your help. Flovent was discontinued by previous PCP Dr. Isidro Holstein on 5-15-17. Marked as not a current medication. Appointment to establish care with you is 6-1-21.

## 2021-06-01 ENCOUNTER — HOSPITAL ENCOUNTER (OUTPATIENT)
Dept: LAB | Age: 51
Discharge: HOME | End: 2021-06-01
Payer: COMMERCIAL

## 2021-06-01 ENCOUNTER — OFFICE VISIT (OUTPATIENT)
Dept: FAMILY MEDICINE CLINIC | Age: 51
End: 2021-06-01
Payer: COMMERCIAL

## 2021-06-01 VITALS
BODY MASS INDEX: 33.33 KG/M2 | SYSTOLIC BLOOD PRESSURE: 125 MMHG | HEIGHT: 66 IN | OXYGEN SATURATION: 96 % | HEART RATE: 75 BPM | DIASTOLIC BLOOD PRESSURE: 64 MMHG | WEIGHT: 207.4 LBS | TEMPERATURE: 90.7 F

## 2021-06-01 DIAGNOSIS — D50.8 IRON DEFICIENCY ANEMIA SECONDARY TO INADEQUATE DIETARY IRON INTAKE: ICD-10-CM

## 2021-06-01 DIAGNOSIS — R73.03 PREDIABETES: ICD-10-CM

## 2021-06-01 DIAGNOSIS — E89.0 POSTOPERATIVE HYPOTHYROIDISM: ICD-10-CM

## 2021-06-01 DIAGNOSIS — I10 ESSENTIAL HYPERTENSION WITH GOAL BLOOD PRESSURE LESS THAN 140/90: Primary | ICD-10-CM

## 2021-06-01 DIAGNOSIS — I10 ESSENTIAL HYPERTENSION WITH GOAL BLOOD PRESSURE LESS THAN 140/90: ICD-10-CM

## 2021-06-01 LAB
ALBUMIN SERPL-MCNC: 3.3 G/DL (ref 3.4–5)
ALBUMIN/GLOB SERPL: 0.9 {RATIO} (ref 0.8–1.7)
ALP SERPL-CCNC: 59 U/L (ref 45–117)
ALT SERPL-CCNC: 33 U/L (ref 13–56)
ANION GAP SERPL CALC-SCNC: 3 MMOL/L (ref 3–18)
AST SERPL-CCNC: 21 U/L (ref 10–38)
BILIRUB SERPL-MCNC: 0.3 MG/DL (ref 0.2–1)
BUN SERPL-MCNC: 7 MG/DL (ref 7–18)
BUN/CREAT SERPL: 9 (ref 12–20)
CALCIUM SERPL-MCNC: 7.9 MG/DL (ref 8.5–10.1)
CHLORIDE SERPL-SCNC: 106 MMOL/L (ref 100–111)
CHOLEST SERPL-MCNC: 167 MG/DL
CO2 SERPL-SCNC: 29 MMOL/L (ref 21–32)
CREAT SERPL-MCNC: 0.78 MG/DL (ref 0.6–1.3)
ERYTHROCYTE [DISTWIDTH] IN BLOOD BY AUTOMATED COUNT: 13.7 % (ref 11.6–14.5)
EST. AVERAGE GLUCOSE BLD GHB EST-MCNC: 117 MG/DL
GLOBULIN SER CALC-MCNC: 3.6 G/DL (ref 2–4)
GLUCOSE SERPL-MCNC: 82 MG/DL (ref 74–99)
HBA1C MFR BLD: 5.7 % (ref 4.2–5.6)
HCT VFR BLD AUTO: 35.3 % (ref 35–45)
HDLC SERPL-MCNC: 78 MG/DL (ref 40–60)
HDLC SERPL: 2.1 {RATIO} (ref 0–5)
HGB BLD-MCNC: 11.4 G/DL (ref 12–16)
LDLC SERPL CALC-MCNC: 78.2 MG/DL (ref 0–100)
LIPID PROFILE,FLP: ABNORMAL
MCH RBC QN AUTO: 30.3 PG (ref 24–34)
MCHC RBC AUTO-ENTMCNC: 32.3 G/DL (ref 31–37)
MCV RBC AUTO: 93.9 FL (ref 74–97)
PLATELET # BLD AUTO: 336 K/UL (ref 135–420)
PMV BLD AUTO: 10.7 FL (ref 9.2–11.8)
POTASSIUM SERPL-SCNC: 3.8 MMOL/L (ref 3.5–5.5)
PROT SERPL-MCNC: 6.9 G/DL (ref 6.4–8.2)
RBC # BLD AUTO: 3.76 M/UL (ref 4.2–5.3)
SODIUM SERPL-SCNC: 138 MMOL/L (ref 136–145)
T4 FREE SERPL-MCNC: 1.5 NG/DL (ref 0.7–1.5)
TRIGL SERPL-MCNC: 54 MG/DL (ref ?–150)
TSH SERPL DL<=0.05 MIU/L-ACNC: 0.3 UIU/ML (ref 0.36–3.74)
VLDLC SERPL CALC-MCNC: 10.8 MG/DL
WBC # BLD AUTO: 9.5 K/UL (ref 4.6–13.2)

## 2021-06-01 PROCEDURE — 80053 COMPREHEN METABOLIC PANEL: CPT

## 2021-06-01 PROCEDURE — 85027 COMPLETE CBC AUTOMATED: CPT

## 2021-06-01 PROCEDURE — 36415 COLL VENOUS BLD VENIPUNCTURE: CPT

## 2021-06-01 PROCEDURE — 83036 HEMOGLOBIN GLYCOSYLATED A1C: CPT

## 2021-06-01 PROCEDURE — 80061 LIPID PANEL: CPT

## 2021-06-01 PROCEDURE — 99214 OFFICE O/P EST MOD 30 MIN: CPT | Performed by: NURSE PRACTITIONER

## 2021-06-01 PROCEDURE — 84443 ASSAY THYROID STIM HORMONE: CPT

## 2021-06-01 PROCEDURE — 84439 ASSAY OF FREE THYROXINE: CPT

## 2021-06-01 NOTE — PROGRESS NOTES
Chief Complaint   Patient presents with   [facility]       1. Have you been to the ER, urgent care clinic since your last visit? Hospitalized since your last visit? No    2. Have you seen or consulted any other health care providers outside of the [address] [place] [address] since your last visit? Include any pap smears or colon screening.  Yes, patient states she had a pap smear today at Before the Call Maintenance Due   Topic Date Due    Hepatitis C Screening  Never done    PAP AKA CERVICAL CYTOLOGY  03/21/2020    A1C test (Diabetic or Prediabetic)  01/30/2021    Breast Cancer Screen Mammogram  04/03/2021

## 2021-06-01 NOTE — PROGRESS NOTES
Maria T Elliott. [facility]      Lizette [place] is a 46 y.o. female and presents with [facility]       Assessment/Plan:    Diagnoses and all orders for this visit:    1. Essential hypertension with goal blood pressure less than 550/32  -     METABOLIC PANEL, COMPREHENSIVE; Future   Endorses medication compliance, Follow-up labs today and Blood pressure stable, continue same medications   2. Postoperative hypothyroidism  -     TSH 3RD GENERATION; Future  -     T4, FREE; Future  Endorses medication compliance, Follow-up labs today and Denies signs and symptoms of hyper or hypothyroidism  3. Prediabetes  -     HEMOGLOBIN A1C WITH EAG; Future  -     LIPID PANEL; Future  Follow-up labs today and Denies signs and symptoms of hyper or hypoglycemia  4. Iron deficiency anemia secondary to inadequate dietary iron intake  -     CBC W/O DIFF; Future  Follow-up labs today      Follow-up and Dispositions    · Return in about 6 months (around 12/1/2021) for HTN, hypothyroid, 15 min, office only, AND, labs prior. Subjective:    Mammogram scheduled for 6/25/21 [facility]    Thyroid Review:  Patient is seen for followup of hypothyroidism. Taking medications daily either 30 minutes prior to or 2 hours after eating? YES  Thyroid ROS: denies fatigue, weight changes, heat/cold intolerance, constipation, diarrhea, fatigue, weakness, edema, paresthesia, irregular menses, hair loss, vocal changes, CVS symptoms.   TSH   Date Value Ref Range Status   07/15/2020 0.88 0.36 - 3.74 uIU/mL Final   01/30/2020 0.21 (L) 0.36 - 3.74 uIU/mL Final   01/18/2019 0.09 (L) 0.36 - 3.74 uIU/mL Final   08/19/2014 2.560 0.450 - 4.500 uIU/mL Final   07/17/2012 1.140 0.450 - 4.500 uIU/mL Final     Comment:     Performed At: [address]  [id number]  Elina Corrales MD  [id number]     T4, Free   Date Value Ref Range Status   01/18/2019 1.9 (H) 0.7 - 1.5 NG/DL Final   08/19/2014 1.10 0.82 - 1.77 ng/dL Final     Hypertension:   Patient reports taking medications as instructed. yes   Medication side effects noted. no  Headache upon wakening. no   Home BP monitoring in range of has not checked. Do you experience chest pain/pressure or SOB with exertion? no  Maintain a low salt diet? yes  Key CAD CHF Meds             losartan-hydroCHLOROthiazide (HYZAAR) 100-25 mg per tablet (Taking) Take 1 tablet by mouth daily for Hypertension            ROS:     ROS  As stated in HPI, otherwise all others negative. The problem list was updated as a part of today's visit. Patient Active Problem List   Diagnosis Code    Essential hypertension with goal blood pressure less than 140/90 I10    Anemia D64.9    Obesity E66.9    Cancer of thyroid ([address] C73    Iron deficiency anemia D50.9    S/P thyroidectomy E89.0    Sore throat J02.9    Urge incontinence N39.41    Postoperative hypothyroidism E89.0    Prediabetes R73.03       The PSH, FH were reviewed. SH:  Social History     Tobacco Use    Smoking status: Never Smoker    Smokeless tobacco: Never Used   Substance Use Topics    Alcohol use: No    Drug use: No       Medications/Allergies:  Current Outpatient Medications on File Prior to Visit   Medication Sig Dispense Refill    albuterol (Ventolin HFA) 90 mcg/actuation inhaler inhale 1 puff by mouth BEFORE EXERCISING (Patient taking differently: inhale 1-2  Puffs Q4-6 Hrs Prn sob) 1 Inhaler 3    fluticasone propionate (FLOVENT HFA) 110 mcg/actuation inhaler Take 2 Puffs by inhalation every twelve (12) hours.  1 Inhaler 3    levothyroxine (Synthroid) 137 mcg tablet TAKE 1 TABLET BY MOUTH  DAILY FOR THYROID 90 Tab 0    losartan-hydroCHLOROthiazide (HYZAAR) 100-25 mg per tablet Take 1 tablet by mouth daily for Hypertension 90 Tab 1    ferrous sulfate 325 mg (65 mg iron) tablet take 1 tablet by mouth once daily 60 Tab 5    ORTHO-NOVUM 7/7/7, 28, 0.5/0.75/1 mg- 35 mcg tab Take 1 Tab by mouth daily. 1    cholecalciferol, VITAMIN D3, (VITAMIN D3) 5,000 unit tab tablet Take  by mouth daily.  calcium carbonate (CALTREX) 600 mg (1,500 mg) tablet Take 600 mg by mouth two (2) times a day. No current facility-administered medications on file prior to visit. Allergies   Allergen Reactions    Codeine Nausea and Vomiting       Objective:  Visit Vitals  /64   Pulse 75   Temp (!) 90.7 °F (32.6 °C) (Temporal)   Ht 5' 6\" (1.676 m)   Wt 207 lb 6.4 oz (94.1 kg)   SpO2 96%   BMI 33.48 kg/m²    Body mass index is 33.48 kg/m². Physical assessment  Physical Exam  Vitals and nursing note reviewed. Eyes:      Conjunctiva/sclera: Conjunctivae normal.      Pupils: Pupils are equal, round, and reactive to light. Neck:      Vascular: No JVD. Cardiovascular:      Rate and Rhythm: Normal rate and regular rhythm. Heart sounds: Normal heart sounds. No murmur heard. No friction rub. No gallop. Pulmonary:      Effort: Pulmonary effort is normal.      Breath sounds: Normal breath sounds. Musculoskeletal:         General: Normal range of motion. Cervical back: Normal range of motion. Skin:     General: Skin is warm and dry. Neurological:      Mental Status: She is alert and oriented to person, place, and time.    Psychiatric:         Mood and Affect: Affect normal.         Cognition and Memory: Memory normal.         Judgment: Judgment normal.           Labwork and Ancillary Studies:    CBC w/Diff  Lab Results   Component Value Date/Time    WBC 11.1 01/30/2020 09:04 AM    HGB 11.6 (L) 01/30/2020 09:04 AM    PLATELET 700 28/13/8309 09:04 AM         Basic Metabolic Profile  Lab Results   Component Value Date/Time    Sodium 136 01/30/2020 09:04 AM    Potassium 3.5 01/30/2020 09:04 AM    Chloride 102 01/30/2020 09:04 AM    CO2 26 01/30/2020 09:04 AM    Anion gap 8 01/30/2020 09:04 AM    Glucose 98 01/30/2020 09:04 AM    BUN 10 01/30/2020 09:04 AM    Creatinine 0.94 01/30/2020 09:04 AM    BUN/Creatinine ratio 11 (L) 01/30/2020 09:04 AM    GFR est AA >60 01/30/2020 09:04 AM    GFR est non-AA >60 01/30/2020 09:04 AM    Calcium 8.2 (L) 01/30/2020 09:04 AM        Cholesterol  Lab Results   Component Value Date/Time    Cholesterol, total 199 01/30/2020 09:04 AM    HDL Cholesterol 112 (H) 01/30/2020 09:04 AM    LDL, calculated 79 01/30/2020 09:04 AM    Triglyceride 40 01/30/2020 09:04 AM    CHOL/HDL Ratio 1.8 01/30/2020 09:04 AM       Health Maintenance:   Health Maintenance   Topic Date Due    Hepatitis C Screening  Never done    PAP AKA CERVICAL CYTOLOGY  03/21/2020    A1C test (Diabetic or Prediabetic)  01/30/2021    Breast Cancer Screen Mammogram  04/03/2021    Shingrix Vaccine Age 50> (1 of 2) 08/06/2021 (Originally 2/4/2020)    Flu Vaccine (Season Ended) 09/01/2021    Lipid Screen  01/30/2025    DTaP/Tdap/Td series (2 - Td) 05/15/2027    Colorectal Cancer Screening Combo  12/23/2030    COVID-19 Vaccine  Completed    Pneumococcal 0-64 years  Aged Out       I have discussed the diagnosis with the patient and the intended plan as seen in the above orders. The patient has received an After-Visit Summary and questions were answered concerning future plans. An After Visit Summary was printed and given to the patient. All diagnosis have been discussed with the patient and all of the patient's questions have been answered. Follow-up and Dispositions    · Return in about 6 months (around 12/1/2021) for HTN, hypothyroid, 15 min, office only, AND, labs prior. Corby Renteria, AGNP-BC  [facility] [place]   [address]  [address]

## 2021-06-03 DIAGNOSIS — E89.0 POSTOPERATIVE HYPOTHYROIDISM: ICD-10-CM

## 2021-06-03 RX ORDER — LEVOTHYROXINE SODIUM 137 UG/1
TABLET ORAL
Qty: 90 TABLET | Refills: 0 | Status: CANCELLED | OUTPATIENT
Start: 2021-06-03

## 2021-06-03 RX ORDER — LEVOTHYROXINE SODIUM 125 UG/1
125 TABLET ORAL
Qty: 90 TABLET | Refills: 0 | Status: SHIPPED | OUTPATIENT
Start: 2021-06-03 | End: 2021-08-30

## 2021-06-03 NOTE — TELEPHONE ENCOUNTER
Please let her know her TSH is too low I have changed the synthroid dose to 125mcg, will recheck at next visit.

## 2021-06-03 NOTE — TELEPHONE ENCOUNTER
Chalo Hahn  Patient Medical Advice Request Pool 1 hour ago (6:50 AM)   AC  Good morning Dr. Anup Gutierrez, I am sending this message to you as a reminder to order my synthroid now that my labs are completed. I take the brand name of the medicine. Thanks you.

## 2021-08-11 RX ORDER — LOSARTAN POTASSIUM AND HYDROCHLOROTHIAZIDE 25; 100 MG/1; MG/1
TABLET ORAL
Qty: 90 TABLET | Refills: 3 | Status: SHIPPED | OUTPATIENT
Start: 2021-08-11 | End: 2021-12-06 | Stop reason: SDUPTHER

## 2021-08-29 DIAGNOSIS — E89.0 POSTOPERATIVE HYPOTHYROIDISM: ICD-10-CM

## 2021-08-30 RX ORDER — LEVOTHYROXINE SODIUM 125 UG/1
TABLET ORAL
Qty: 90 TABLET | Refills: 0 | Status: SHIPPED | OUTPATIENT
Start: 2021-08-30 | End: 2021-11-17

## 2021-09-10 DIAGNOSIS — J45.40 MODERATE PERSISTENT ASTHMA WITHOUT COMPLICATION: ICD-10-CM

## 2021-09-13 RX ORDER — DEXAMETHASONE 4 MG/1
TABLET ORAL
Qty: 12 G | Refills: 0 | Status: SHIPPED | OUTPATIENT
Start: 2021-09-13 | End: 2021-10-11 | Stop reason: SDUPTHER

## 2021-10-11 DIAGNOSIS — J45.40 MODERATE PERSISTENT ASTHMA WITHOUT COMPLICATION: ICD-10-CM

## 2021-10-11 RX ORDER — FLUTICASONE PROPIONATE 110 UG/1
AEROSOL, METERED RESPIRATORY (INHALATION)
Qty: 12 G | Refills: 0 | Status: SHIPPED | OUTPATIENT
Start: 2021-10-11 | End: 2021-11-08

## 2021-11-07 DIAGNOSIS — J45.40 MODERATE PERSISTENT ASTHMA WITHOUT COMPLICATION: ICD-10-CM

## 2021-11-08 RX ORDER — FLUTICASONE PROPIONATE 110 UG/1
AEROSOL, METERED RESPIRATORY (INHALATION)
Qty: 12 G | Refills: 0 | Status: SHIPPED | OUTPATIENT
Start: 2021-11-08 | End: 2021-12-07

## 2021-11-17 DIAGNOSIS — E89.0 POSTOPERATIVE HYPOTHYROIDISM: ICD-10-CM

## 2021-11-17 RX ORDER — LEVOTHYROXINE SODIUM 125 UG/1
TABLET ORAL
Qty: 90 TABLET | Refills: 0 | Status: SHIPPED | OUTPATIENT
Start: 2021-11-17 | End: 2022-02-14

## 2021-11-29 ENCOUNTER — HOSPITAL ENCOUNTER (OUTPATIENT)
Dept: LAB | Age: 51
Discharge: HOME | End: 2021-11-29
Payer: COMMERCIAL

## 2021-11-29 LAB
ALBUMIN SERPL-MCNC: 3.1 G/DL (ref 3.4–5)
ALBUMIN/GLOB SERPL: 0.9 {RATIO} (ref 0.8–1.7)
ALP SERPL-CCNC: 53 U/L (ref 45–117)
ALT SERPL-CCNC: 19 U/L (ref 13–56)
ANION GAP SERPL CALC-SCNC: 7 MMOL/L (ref 3–18)
AST SERPL-CCNC: 18 U/L (ref 10–38)
BILIRUB SERPL-MCNC: 0.3 MG/DL (ref 0.2–1)
BUN SERPL-MCNC: 11 MG/DL (ref 7–18)
BUN/CREAT SERPL: 14 (ref 12–20)
CALCIUM SERPL-MCNC: 9.2 MG/DL (ref 8.5–10.1)
CHLORIDE SERPL-SCNC: 108 MMOL/L (ref 100–111)
CO2 SERPL-SCNC: 27 MMOL/L (ref 21–32)
CREAT SERPL-MCNC: 0.8 MG/DL (ref 0.6–1.3)
GLOBULIN SER CALC-MCNC: 3.4 G/DL (ref 2–4)
GLUCOSE SERPL-MCNC: 97 MG/DL (ref 74–99)
POTASSIUM SERPL-SCNC: 4.2 MMOL/L (ref 3.5–5.5)
PROT SERPL-MCNC: 6.5 G/DL (ref 6.4–8.2)
SODIUM SERPL-SCNC: 142 MMOL/L (ref 136–145)
T4 FREE SERPL-MCNC: 1.2 NG/DL (ref 0.7–1.5)
TSH SERPL DL<=0.05 MIU/L-ACNC: 1.44 UIU/ML (ref 0.36–3.74)

## 2021-11-29 PROCEDURE — 80053 COMPREHEN METABOLIC PANEL: CPT

## 2021-11-29 PROCEDURE — 36415 COLL VENOUS BLD VENIPUNCTURE: CPT

## 2021-11-29 PROCEDURE — 84439 ASSAY OF FREE THYROXINE: CPT

## 2021-12-05 DIAGNOSIS — J45.40 MODERATE PERSISTENT ASTHMA WITHOUT COMPLICATION: ICD-10-CM

## 2021-12-06 ENCOUNTER — OFFICE VISIT (OUTPATIENT)
Dept: FAMILY MEDICINE CLINIC | Age: 51
End: 2021-12-06
Payer: COMMERCIAL

## 2021-12-06 VITALS
HEIGHT: 66 IN | RESPIRATION RATE: 18 BRPM | OXYGEN SATURATION: 99 % | TEMPERATURE: 98.2 F | DIASTOLIC BLOOD PRESSURE: 73 MMHG | BODY MASS INDEX: 29.57 KG/M2 | SYSTOLIC BLOOD PRESSURE: 137 MMHG | WEIGHT: 184 LBS | HEART RATE: 73 BPM

## 2021-12-06 DIAGNOSIS — I10 ESSENTIAL HYPERTENSION WITH GOAL BLOOD PRESSURE LESS THAN 140/90: Primary | ICD-10-CM

## 2021-12-06 DIAGNOSIS — E89.0 POSTOPERATIVE HYPOTHYROIDISM: ICD-10-CM

## 2021-12-06 DIAGNOSIS — Z11.59 NEED FOR HEPATITIS C SCREENING TEST: ICD-10-CM

## 2021-12-06 PROCEDURE — 99214 OFFICE O/P EST MOD 30 MIN: CPT | Performed by: NURSE PRACTITIONER

## 2021-12-06 RX ORDER — LOSARTAN POTASSIUM AND HYDROCHLOROTHIAZIDE 25; 100 MG/1; MG/1
1 TABLET ORAL DAILY
Qty: 90 TABLET | Refills: 3 | Status: SHIPPED | OUTPATIENT
Start: 2021-12-06

## 2021-12-06 NOTE — PROGRESS NOTES
Room 7    Did patient bring someone? No    Did the patient have DME equipment? No     Did you take your medication today? Yes       1. Have you been to the ER, urgent care clinic since your last visit? Hospitalized since your last visit? No    2. Have you seen or consulted any other health care providers outside of the [address] [place] [address] since your last visit? Include any pap smears or colon screening.  No    3 most recent PHQ Screens 12/6/2021   Little interest or pleasure in doing things Not at all   Feeling down, depressed, irritable, or hopeless Not at all   Total Score PHQ 2 0         Health Maintenance Due   Topic Date Due    Hepatitis C Screening  Never done    Cervical cancer screen  Never done    Shingrix Vaccine Age 50> (1 of 2) Never done    Flu Vaccine (1) Never done       Learning Assessment 1/21/2015   PRIMARY LEARNER Patient   HIGHEST LEVEL OF EDUCATION - PRIMARY LEARNER  4 YEARS OF COLLEGE   BARRIERS PRIMARY LEARNER NONE   PRIMARY LANGUAGE ENGLISH    NEED -   LEARNER PREFERENCE PRIMARY DEMONSTRATION   LEARNING SPECIAL TOPICS -   ANSWERED BY patient   RELATIONSHIP SELF

## 2021-12-06 NOTE — PROGRESS NOTES
Maria T Elliott. [facility]      Jame Marrufo is a 46 y.o. female and presents with Follow Up Chronic Condition, Hypertension, and Hypothyroidism       Assessment/Plan:    Diagnoses and all orders for this visit:    1. Essential hypertension with goal blood pressure less than 140/90  -     losartan-hydroCHLOROthiazide (HYZAAR) 100-25 mg per tablet; Take 1 Tablet by mouth daily.  -     METABOLIC PANEL, COMPREHENSIVE; Future  Endorses medication compliance, Refill provided, Follow up labs prior to next visit and Blood pressure stable, continue same medications   2. Postoperative hypothyroidism  -     TSH 3RD GENERATION; Future  -     T4, FREE; Future  Endorses medication compliance, Follow up labs prior to next visit and Denies signs and symptoms of hyper or hypothyroidism   3. Need for hepatitis C screening test  -     HEPATITIS C AB; Future  Health maintenance needs addressed       Follow-up and Dispositions    · Return in about 6 months (around 6/6/2022) for HTN, hypothyroid, 15 min, office only, with, labs prior. Health Maintenance:   Health Maintenance   Topic Date Due    Hepatitis C Screening  Never done    Cervical cancer screen  Never done    Shingrix Vaccine Age 50> (1 of 2) Never done    Flu Vaccine (1) Never done    A1C test (Diabetic or Prediabetic)  06/01/2022    Breast Cancer Screen Mammogram  06/24/2022    Lipid Screen  06/01/2026    DTaP/Tdap/Td series (2 - Td or Tdap) 05/15/2027    Colorectal Cancer Screening Combo  12/23/2030    COVID-19 Vaccine  Completed    Pneumococcal 0-64 years  Aged Out        Subjective:    Hypertension:   Patient reports taking medications as instructed. yes   Medication side effects noted. no  Headache upon wakening. no   Home BP monitoring in range of does not check's systolic. Do you experience chest pain/pressure or SOB with exertion? no  Maintain a low Sodium diet?  yes  Key CAD CHF Meds             losartan-hydroCHLOROthiazide (HYZAAR) 100-25 mg per tablet (Taking) Take 1 Tablet by mouth daily. BUN   Date Value Ref Range Status   11/29/2021 11 7.0 - 18 MG/DL Final     Creatinine   Date Value Ref Range Status   11/29/2021 0.80 0.6 - 1.3 MG/DL Final     GFR est AA   Date Value Ref Range Status   11/29/2021 >60 >60 ml/min/1.73m2 Final     Potassium   Date Value Ref Range Status   11/29/2021 4.2 3.5 - 5.5 mmol/L Final       Thyroid Review:  Patient is seen for followup of hypothyroidism and status post thyroid surgery total thyroidectomy. Taking medications daily either 30 minutes prior to or 2 hours after eating? YES  Thyroid ROS: denies fatigue, weight changes, heat/cold intolerance, constipation, diarrhea, fatigue, weakness, edema, paresthesia, irregular menses, hair loss, vocal changes, CVS symptoms. TSH   Date Value Ref Range Status   11/29/2021 1.44 0.36 - 3.74 uIU/mL Final   06/01/2021 0.30 (L) 0.36 - 3.74 uIU/mL Final   07/15/2020 0.88 0.36 - 3.74 uIU/mL Final   01/30/2020 0.21 (L) 0.36 - 3.74 uIU/mL Final   01/18/2019 0.09 (L) 0.36 - 3.74 uIU/mL Final     T4, Free   Date Value Ref Range Status   11/29/2021 1.2 0.7 - 1.5 NG/DL Final   06/01/2021 1.5 0.7 - 1.5 NG/DL Final   01/18/2019 1.9 (H) 0.7 - 1.5 NG/DL Final   08/19/2014 1.10 0.82 - 1.77 ng/dL Final       ROS:     ROS  As stated in HPI, otherwise all others negative. The problem list was updated as a part of today's visit. Patient Active Problem List   Diagnosis Code    Essential hypertension with goal blood pressure less than 140/90 I10    Anemia D64.9    Obesity E66.9    Cancer of thyroid ([facility] [address] C73    Iron deficiency anemia D50.9    S/P thyroidectomy E89.0    Sore throat J02.9    Urge incontinence N39.41    Postoperative hypothyroidism E89.0    Prediabetes R73.03       The PSH, FH were reviewed.       SH:  Social History     Tobacco Use    Smoking status: Never Smoker    Smokeless tobacco: Never Used Substance Use Topics    Alcohol use: No    Drug use: No       Medications/Allergies:  Current Outpatient Medications on File Prior to Visit   Medication Sig Dispense Refill    Synthroid 125 mcg tablet take 1 tablet by mouth once daily before breakfast 90 Tablet 0    albuterol (Ventolin HFA) 90 mcg/actuation inhaler inhale 1 puff by mouth BEFORE EXERCISING (Patient taking differently: inhale 1-2  Puffs Q4-6 Hrs Prn sob) 1 Inhaler 3    ferrous sulfate 325 mg (65 mg iron) tablet take 1 tablet by mouth once daily 60 Tab 5    ORTHO-NOVUM 7/7/7, 28, 0.5/0.75/1 mg- 35 mcg tab Take 1 Tab by mouth daily. 1    cholecalciferol, VITAMIN D3, (VITAMIN D3) 5,000 unit tab tablet Take  by mouth daily.  calcium carbonate (CALTREX) 600 mg (1,500 mg) tablet Take 600 mg by mouth two (2) times a day.  fluticasone propionate (Flovent HFA) 110 mcg/actuation inhaler inhale 2 puffs by mouth and INTO THE LUNGS every 12 hours 12 g 0     No current facility-administered medications on file prior to visit. Allergies   Allergen Reactions    Codeine Nausea and Vomiting       Objective:  Visit Vitals  /73 (BP 1 Location: Left arm, BP Patient Position: Sitting, BP Cuff Size: Adult)   Pulse 73   Temp 98.2 °F (36.8 °C) (Temporal)   Resp 18   Ht 5' 6\" (1.676 m)   Wt 184 lb (83.5 kg)   SpO2 99%   BMI 29.70 kg/m²    Body mass index is 29.7 kg/m². Physical assessment  Physical Exam  Vitals and nursing note reviewed. Eyes:      Conjunctiva/sclera: Conjunctivae normal.      Pupils: Pupils are equal, round, and reactive to light. Neck:      Vascular: No JVD. Cardiovascular:      Rate and Rhythm: Normal rate and regular rhythm. Heart sounds: Normal heart sounds. No murmur heard. No friction rub. No gallop. Pulmonary:      Effort: Pulmonary effort is normal.      Breath sounds: Normal breath sounds. Musculoskeletal:         General: Normal range of motion. Cervical back: Normal range of motion. Skin:     General: Skin is warm and dry. Neurological:      Mental Status: She is alert and oriented to person, place, and time. Psychiatric:         Mood and Affect: Affect normal.         Cognition and Memory: Memory normal.         Judgment: Judgment normal.           Labwork and Ancillary Studies:    CBC w/Diff  Lab Results   Component Value Date/Time    WBC 9.5 06/01/2021 02:59 PM    HGB 11.4 (L) 06/01/2021 02:59 PM    PLATELET 126 67/75/0507 02:59 PM         Basic Metabolic Profile  Lab Results   Component Value Date/Time    Sodium 142 11/29/2021 10:15 AM    Potassium 4.2 11/29/2021 10:15 AM    Chloride 108 11/29/2021 10:15 AM    CO2 27 11/29/2021 10:15 AM    Anion gap 7 11/29/2021 10:15 AM    Glucose 97 11/29/2021 10:15 AM    BUN 11 11/29/2021 10:15 AM    Creatinine 0.80 11/29/2021 10:15 AM    BUN/Creatinine ratio 14 11/29/2021 10:15 AM    GFR est AA >60 11/29/2021 10:15 AM    GFR est non-AA >60 11/29/2021 10:15 AM    Calcium 9.2 11/29/2021 10:15 AM        Cholesterol  Lab Results   Component Value Date/Time    Cholesterol, total 167 06/01/2021 02:59 PM    HDL Cholesterol 78 (H) 06/01/2021 02:59 PM    LDL, calculated 78.2 06/01/2021 02:59 PM    Triglyceride 54 06/01/2021 02:59 PM    CHOL/HDL Ratio 2.1 06/01/2021 02:59 PM           I have discussed the diagnosis with the patient and the intended plan as seen in the above orders. The patient has received an After-Visit Summary and questions were answered concerning future plans. An After Visit Summary was printed and given to the patient. All diagnosis have been discussed with the patient and all of the patient's questions have been answered. Follow-up and Dispositions    · Return in about 6 months (around 6/6/2022) for HTN, hypothyroid, 15 min, office only, with, labs prior. Hayley Correia, AGNP-BC  [address] [facility]   [address] [facility] [address]  [id number]

## 2021-12-07 RX ORDER — FLUTICASONE PROPIONATE 110 UG/1
AEROSOL, METERED RESPIRATORY (INHALATION)
Qty: 12 G | Refills: 0 | Status: SHIPPED | OUTPATIENT
Start: 2021-12-07 | End: 2022-01-03

## 2022-02-14 DIAGNOSIS — E89.0 POSTOPERATIVE HYPOTHYROIDISM: ICD-10-CM

## 2022-02-14 RX ORDER — LEVOTHYROXINE SODIUM 125 UG/1
TABLET ORAL
Qty: 90 TABLET | Refills: 0 | Status: SHIPPED | OUTPATIENT
Start: 2022-02-14 | End: 2022-05-13

## 2022-03-08 ENCOUNTER — PATIENT MESSAGE (OUTPATIENT)
Dept: FAMILY MEDICINE CLINIC | Age: 52
End: 2022-03-08

## 2022-03-09 DIAGNOSIS — D50.9 IRON DEFICIENCY ANEMIA, UNSPECIFIED IRON DEFICIENCY ANEMIA TYPE: ICD-10-CM

## 2022-03-09 RX ORDER — LANOLIN ALCOHOL/MO/W.PET/CERES
325 CREAM (GRAM) TOPICAL DAILY
Qty: 60 TABLET | Refills: 5 | Status: SHIPPED | OUTPATIENT
Start: 2022-03-09

## 2022-03-09 NOTE — TELEPHONE ENCOUNTER
From: Josse Vasquez  To: [facility]  Sent: 3/8/2022 3:06 PM EST  Subject: Left Brest pain    I woke up Friday morning with pain in my left breast. Its a shooting pain. It comes and goes but when I take my bra off an intense pain surges through the left side is the left boob. I need to be checked out asap. I called my gyn shes booked up and cant see me until June 27th. I would like to try to reveal the source of this pain long before June 27th. My last mammogram was June 25th so without an order to get one before then I wouldnt be able to do that. Please call me and let me know if I can be see Dr. Roshan Hollis as soon as possible. Also my  Harsha Hoskins also a patient of Dr. Roshan Hollis is out of his blood pressure medicine and needs to have a script sent to [facility] on [address] and I need my iron pill script sent there as well. Thank you so much for your assistance with this matter.

## 2022-03-10 ENCOUNTER — OFFICE VISIT (OUTPATIENT)
Dept: FAMILY MEDICINE CLINIC | Age: 52
End: 2022-03-10
Payer: COMMERCIAL

## 2022-03-10 VITALS
HEART RATE: 75 BPM | OXYGEN SATURATION: 97 % | WEIGHT: 185 LBS | SYSTOLIC BLOOD PRESSURE: 127 MMHG | RESPIRATION RATE: 16 BRPM | HEIGHT: 66 IN | BODY MASS INDEX: 29.73 KG/M2 | DIASTOLIC BLOOD PRESSURE: 75 MMHG | TEMPERATURE: 98.3 F

## 2022-03-10 DIAGNOSIS — N64.4 BREAST PAIN, LEFT: Primary | ICD-10-CM

## 2022-03-10 PROCEDURE — 99214 OFFICE O/P EST MOD 30 MIN: CPT | Performed by: NURSE PRACTITIONER

## 2022-03-10 NOTE — PROGRESS NOTES
Maria T Elliott. [facility]      Venkata Peter is a 46 y.o. female and presents with Breast pain (Left x 6 days; No pain unless she takes her bra off.)       Assessment/Plan:    Diagnoses and all orders for this visit:    1. Breast pain, left  -     PARRISH MAMMO LT DX INCL CAD; Future  -     US BREAST LT LIMITED=<3 QUAD; Future      New onset breast pain, will send for mammogram and US for further evaluation  Follow-up and Dispositions    · Return for keep previously scheduled follow up. Health Maintenance:   Health Maintenance   Topic Date Due    Hepatitis C Screening  Never done    Pneumococcal 0-64 years (1 of 2 - PPSV23) Never done    Cervical cancer screen  Never done    Shingrix Vaccine Age 50> (1 of 2) Never done    Flu Vaccine (1) Never done    A1C test (Diabetic or Prediabetic)  06/01/2022    Breast Cancer Screen Mammogram  06/24/2022    Depression Screen  12/06/2022    Lipid Screen  06/01/2026    DTaP/Tdap/Td series (2 - Td or Tdap) 05/15/2027    Colorectal Cancer Screening Combo  12/23/2030    COVID-19 Vaccine  Completed        Subjective:    Labs obtained prior to visit? NO  Reviewed with patient? Not applicable    Left breast pain  Onset: Friday, 6 days ago  Location: left side of left breast  Characteristics: feels the pain with movement, bouncing, if her breast is hanging, no pain with bra on  Denies feeling a mass or skin changes, no mass or bumps in arm or axilla either  Last mammogram: June 25, 2021, which was negative  Onset was sudden, the pain is constant unless her breast is supported    ROS:     ROS  As stated in HPI, otherwise all others negative. The problem list was updated as a part of today's visit.   Patient Active Problem List   Diagnosis Code    Essential hypertension with goal blood pressure less than 140/90 I10    Anemia D64.9    Obesity E66.9    Cancer of thyroid ([address] C73    Iron deficiency anemia D50.9    S/P thyroidectomy E89.0    Sore throat J02.9    Urge incontinence N39.41    Postoperative hypothyroidism E89.0    Prediabetes R73.03       The PSH, FH were reviewed. SH:  Social History     Tobacco Use    Smoking status: Never Smoker    Smokeless tobacco: Never Used   Substance Use Topics    Alcohol use: No    Drug use: No       Medications/Allergies:  Current Outpatient Medications on File Prior to Visit   Medication Sig Dispense Refill    ferrous sulfate 325 mg (65 mg iron) tablet Take 1 Tablet by mouth daily. 60 Tablet 5    Synthroid 125 mcg tablet take 1 tablet by mouth once daily before breakfast 90 Tablet 0    losartan-hydroCHLOROthiazide (HYZAAR) 100-25 mg per tablet Take 1 Tablet by mouth daily. 90 Tablet 3    albuterol (Ventolin HFA) 90 mcg/actuation inhaler inhale 1 puff by mouth BEFORE EXERCISING (Patient taking differently: inhale 1-2  Puffs Q4-6 Hrs Prn sob) 1 Inhaler 3    ORTHO-NOVUM 7/7/7, 28, 0.5/0.75/1 mg- 35 mcg tab Take 1 Tab by mouth daily. 1    cholecalciferol, VITAMIN D3, (VITAMIN D3) 5,000 unit tab tablet Take  by mouth daily.  calcium carbonate (CALTREX) 600 mg (1,500 mg) tablet Take 600 mg by mouth two (2) times a day.  fluticasone propionate (Flovent HFA) 110 mcg/actuation inhaler inhale 2 puffs by mouth and INTO THE LUNGS every 12 hours (Patient taking differently: inhale 2 puffs by mouth and INTO THE LUNGS every 12 hours as needed) 12 g 5     No current facility-administered medications on file prior to visit. Allergies   Allergen Reactions    Codeine Nausea and Vomiting       Objective:  Visit Vitals  /75 (BP 1 Location: Left upper arm, BP Patient Position: Sitting, BP Cuff Size: Large adult long)   Pulse 75   Temp 98.3 °F (36.8 °C) (Temporal)   Resp 16   Ht 5' 6\" (1.676 m)   Wt 185 lb (83.9 kg)   SpO2 97%   BMI 29.86 kg/m²    Body mass index is 29.86 kg/m².     Physical assessment  Physical Exam  Chest:   Breasts:      Right: Normal. No swelling, bleeding, inverted nipple, mass, nipple discharge, skin change, tenderness, axillary adenopathy or supraclavicular adenopathy. Left: Normal. No swelling, bleeding, inverted nipple, mass, nipple discharge, skin change, tenderness, axillary adenopathy or supraclavicular adenopathy. Lymphadenopathy:      Upper Body:      Right upper body: No supraclavicular, axillary or pectoral adenopathy. Left upper body: No supraclavicular, axillary or pectoral adenopathy. Labwork and Ancillary Studies:    CBC w/Diff  Lab Results   Component Value Date/Time    WBC 9.5 06/01/2021 02:59 PM    HGB 11.4 (L) 06/01/2021 02:59 PM    PLATELET 375 98/60/0658 02:59 PM         Basic Metabolic Profile  Lab Results   Component Value Date/Time    Sodium 142 11/29/2021 10:15 AM    Potassium 4.2 11/29/2021 10:15 AM    Chloride 108 11/29/2021 10:15 AM    CO2 27 11/29/2021 10:15 AM    Anion gap 7 11/29/2021 10:15 AM    Glucose 97 11/29/2021 10:15 AM    BUN 11 11/29/2021 10:15 AM    Creatinine 0.80 11/29/2021 10:15 AM    BUN/Creatinine ratio 14 11/29/2021 10:15 AM    GFR est AA >60 11/29/2021 10:15 AM    GFR est non-AA >60 11/29/2021 10:15 AM    Calcium 9.2 11/29/2021 10:15 AM        Cholesterol  Lab Results   Component Value Date/Time    Cholesterol, total 167 06/01/2021 02:59 PM    HDL Cholesterol 78 (H) 06/01/2021 02:59 PM    LDL, calculated 78.2 06/01/2021 02:59 PM    Triglyceride 54 06/01/2021 02:59 PM    CHOL/HDL Ratio 2.1 06/01/2021 02:59 PM           I have discussed the diagnosis with the patient and the intended plan as seen in the above orders. The patient has received an After-Visit Summary and questions were answered concerning future plans. An After Visit Summary was printed and given to the patient. All diagnosis have been discussed with the patient and all of the patient's questions have been answered.      Follow-up and Dispositions    · Return for keep previously scheduled follow up.           Afia Pérez, [facility]-BC  [address] [facility]   [address] [facility] [address]  [id number]

## 2022-03-10 NOTE — PROGRESS NOTES
1. \"Have you been to the ER, urgent care clinic since your last visit? Hospitalized since your last visit? \" No    2. \"Have you seen or consulted any other health care providers outside of the [address] [place] [address] since your last visit? \" No     3. For patients aged 39-70: Has the patient had a colonoscopy / FIT/ Cologuard? Yes - no Care Gap present      If the patient is female:    4. For patients aged 41-77: Has the patient had a mammogram within the past 2 years? Yes - no Care Gap present      5. For patients aged 21-65: Has the patient had a pap smear? Yes - Care Gap present.  Rooming MA/LPN to request most recent results Appointment for annual is 6-27-22

## 2022-03-19 PROBLEM — E89.0 POSTOPERATIVE HYPOTHYROIDISM: Status: ACTIVE | Noted: 2020-01-02

## 2022-03-20 PROBLEM — R73.03 PREDIABETES: Status: ACTIVE | Noted: 2020-01-02

## 2022-03-20 PROBLEM — N39.41 URGE INCONTINENCE: Status: ACTIVE | Noted: 2017-05-15

## 2022-05-12 DIAGNOSIS — E89.0 POSTOPERATIVE HYPOTHYROIDISM: ICD-10-CM

## 2022-05-13 RX ORDER — LEVOTHYROXINE SODIUM 125 UG/1
TABLET ORAL
Qty: 90 TABLET | Refills: 0 | Status: SHIPPED | OUTPATIENT
Start: 2022-05-13 | End: 2022-08-11

## 2022-05-24 ENCOUNTER — HOSPITAL ENCOUNTER (OUTPATIENT)
Dept: LAB | Age: 52
Discharge: HOME | End: 2022-05-24
Payer: COMMERCIAL

## 2022-05-24 LAB
ALBUMIN SERPL-MCNC: 3.5 G/DL (ref 3.4–5)
ALBUMIN/GLOB SERPL: 1 {RATIO} (ref 0.8–1.7)
ALP SERPL-CCNC: 41 U/L (ref 45–117)
ALT SERPL-CCNC: 25 U/L (ref 13–56)
ANION GAP SERPL CALC-SCNC: 8 MMOL/L (ref 3–18)
AST SERPL-CCNC: 29 U/L (ref 10–38)
BILIRUB SERPL-MCNC: 0.4 MG/DL (ref 0.2–1)
BUN SERPL-MCNC: 16 MG/DL (ref 7–18)
BUN/CREAT SERPL: 18 (ref 12–20)
CALCIUM SERPL-MCNC: 9 MG/DL (ref 8.5–10.1)
CHLORIDE SERPL-SCNC: 104 MMOL/L (ref 100–111)
CO2 SERPL-SCNC: 26 MMOL/L (ref 21–32)
CREAT SERPL-MCNC: 0.9 MG/DL (ref 0.6–1.3)
GLOBULIN SER CALC-MCNC: 3.6 G/DL (ref 2–4)
GLUCOSE SERPL-MCNC: 90 MG/DL (ref 74–99)
POTASSIUM SERPL-SCNC: 4.1 MMOL/L (ref 3.5–5.5)
PROT SERPL-MCNC: 7.1 G/DL (ref 6.4–8.2)
SODIUM SERPL-SCNC: 138 MMOL/L (ref 136–145)
T4 FREE SERPL-MCNC: 1.7 NG/DL (ref 0.7–1.5)
TSH SERPL DL<=0.05 MIU/L-ACNC: 3 UIU/ML (ref 0.36–3.74)

## 2022-05-24 PROCEDURE — 36415 COLL VENOUS BLD VENIPUNCTURE: CPT

## 2022-05-24 PROCEDURE — 80053 COMPREHEN METABOLIC PANEL: CPT

## 2022-05-24 PROCEDURE — 86803 HEPATITIS C AB TEST: CPT

## 2022-05-24 PROCEDURE — 84439 ASSAY OF FREE THYROXINE: CPT

## 2022-05-25 LAB
HCV AB SER IA-ACNC: 0.13 INDEX
HCV AB SERPL QL IA: NEGATIVE
HCV COMMENT,HCGAC: NORMAL

## 2022-06-06 ENCOUNTER — OFFICE VISIT (OUTPATIENT)
Dept: FAMILY MEDICINE CLINIC | Age: 52
End: 2022-06-06
Payer: COMMERCIAL

## 2022-06-06 VITALS
DIASTOLIC BLOOD PRESSURE: 80 MMHG | SYSTOLIC BLOOD PRESSURE: 122 MMHG | OXYGEN SATURATION: 100 % | HEART RATE: 75 BPM | TEMPERATURE: 97.6 F | BODY MASS INDEX: 30.05 KG/M2 | WEIGHT: 187 LBS | HEIGHT: 66 IN | RESPIRATION RATE: 18 BRPM

## 2022-06-06 DIAGNOSIS — R73.03 PREDIABETES: ICD-10-CM

## 2022-06-06 DIAGNOSIS — E89.0 POSTOPERATIVE HYPOTHYROIDISM: ICD-10-CM

## 2022-06-06 DIAGNOSIS — I10 ESSENTIAL HYPERTENSION WITH GOAL BLOOD PRESSURE LESS THAN 140/90: Primary | ICD-10-CM

## 2022-06-06 LAB — HBA1C MFR BLD HPLC: 5.2 %

## 2022-06-06 PROCEDURE — 99214 OFFICE O/P EST MOD 30 MIN: CPT | Performed by: NURSE PRACTITIONER

## 2022-06-06 PROCEDURE — 83036 HEMOGLOBIN GLYCOSYLATED A1C: CPT | Performed by: NURSE PRACTITIONER

## 2022-06-06 NOTE — PROGRESS NOTES
Room 7    Patient presents today for A1C of 5.2    When asked if patient has any concerns he would like to address with LAWSON Wells patient states no . Did patient bring someone? No    Did the patient have DME equipment? No     Did you take your medication today? Yes      1. \"Have you been to the ER, urgent care clinic since your last visit? Hospitalized since your last visit? \" No    2. \"Have you seen or consulted any other health care providers outside of the [address] [place] [address] since your last visit? \" No     3. For patients aged 39-70: Has the patient had a colonoscopy / FIT/ Cologuard? Yes - no Care Gap present      If the patient is female:    4. For patients aged 41-77: Has the patient had a mammogram within the past 2 years? Yes - no Care Gap present Patient states I went to [facility] for Mammo. 5. For patients aged 21-65: Has the patient had a pap smear?  Yes - no Care Gap present        3 most recent PHQ Screens 6/6/2022   Little interest or pleasure in doing things Not at all   Feeling down, depressed, irritable, or hopeless Not at all   Total Score PHQ 2 0         Health Maintenance Due   Topic Date Due    Pneumococcal 0-64 years (1 - PCV) Never done    Shingrix Vaccine Age 50> (1 of 2) Never done    A1C test (Diabetic or Prediabetic)  06/01/2022       Learning Assessment 1/21/2015   PRIMARY LEARNER Patient   HIGHEST LEVEL OF EDUCATION - PRIMARY LEARNER  4 YEARS OF COLLEGE   BARRIERS PRIMARY LEARNER NONE   PRIMARY LANGUAGE ENGLISH    NEED -   LEARNER PREFERENCE PRIMARY DEMONSTRATION   LEARNING SPECIAL TOPICS -   ANSWERED BY patient   RELATIONSHIP SELF

## 2022-06-06 NOTE — PROGRESS NOTES
Maria T Elliott. Formerly [facility], 1928–1983 86      Geni Sierra is a 46 y.o. female and presents with Follow Up Chronic Condition, Hypertension, and Hypothyroidism       Assessment/Plan:    Diagnoses and all orders for this visit:    1. Essential hypertension with goal blood pressure less than 973/65  -     METABOLIC PANEL, COMPREHENSIVE; Future  Endorses medication compliance, Follow up labs prior to next visit and Blood pressure stable, continue hyzaar at same dose   2. Postoperative hypothyroidism  -     TSH 3RD GENERATION; Future  -     T4, FREE; Future  Endorses medication compliance, Follow up labs prior to next visit and Denies signs and symptoms of hyper or hypothyroidism   3. Prediabetes  -     AMB POC HEMOGLOBIN A1C  Follow up A1C today  A1C 5.2%  Continue to monitor    Follow-up and Dispositions    · Return in about 6 months (around 12/6/2022) for HTN, hypothyroid, 15 min, office only, with, labs prior. Health Maintenance:   Health Maintenance   Topic Date Due    Pneumococcal 0-64 years (1 - PCV) Never done    Shingrix Vaccine Age 50> (1 of 2) Never done    A1C test (Diabetic or Prediabetic)  06/01/2022    Flu Vaccine (Season Ended) 09/01/2022    Depression Screen  12/06/2022    Breast Cancer Screen Mammogram  03/16/2024    Cervical cancer screen  06/01/2026    Lipid Screen  06/01/2026    DTaP/Tdap/Td series (2 - Td or Tdap) 05/15/2027    Colorectal Cancer Screening Combo  12/23/2030    Hepatitis C Screening  Completed    COVID-19 Vaccine  Completed        Subjective:    Labs obtained prior to visit? YES  Reviewed with patient? Yes    Hypertension:  Visit Vitals  /80   Pulse 75   Temp 97.6 °F (36.4 °C) (Temporal)   Resp 18   Ht 5' 6\" (1.676 m)   Wt 187 lb (84.8 kg)   SpO2 100%   BMI 30.18 kg/m²      Patient reports taking medications as instructed. yes   Medication side effects noted. no  Headache upon wakening.  no   Home BP monitoring: Does not check  Do you experience chest pain/pressure or SOB with exertion? no  Maintain a low Sodium diet? yes  Key CAD CHF Meds             losartan-hydroCHLOROthiazide (HYZAAR) 100-25 mg per tablet (Taking) Take 1 Tablet by mouth daily. BUN   Date Value Ref Range Status   05/24/2022 16 7.0 - 18 MG/DL Final     Creatinine   Date Value Ref Range Status   05/24/2022 0.90 0.6 - 1.3 MG/DL Final     GFR est AA   Date Value Ref Range Status   05/24/2022 >60 >60 ml/min/1.73m2 Final     Potassium   Date Value Ref Range Status   05/24/2022 4.1 3.5 - 5.5 mmol/L Final       Thyroid Review:  Patient is seen for followup of hypothyroidism. Taking medications daily either 30 minutes prior to or 2 hours after eating? YES  Thyroid ROS: denies fatigue, weight changes, heat/cold intolerance, constipation, diarrhea, fatigue, weakness, edema, paresthesia, irregular menses, hair loss, vocal changes, CVS symptoms. TSH   Date Value Ref Range Status   05/24/2022 3.00 0.36 - 3.74 uIU/mL Final   11/29/2021 1.44 0.36 - 3.74 uIU/mL Final   06/01/2021 0.30 (L) 0.36 - 3.74 uIU/mL Final   07/15/2020 0.88 0.36 - 3.74 uIU/mL Final   01/30/2020 0.21 (L) 0.36 - 3.74 uIU/mL Final     T4, Free   Date Value Ref Range Status   05/24/2022 1.7 (H) 0.7 - 1.5 NG/DL Final   11/29/2021 1.2 0.7 - 1.5 NG/DL Final   06/01/2021 1.5 0.7 - 1.5 NG/DL Final   01/18/2019 1.9 (H) 0.7 - 1.5 NG/DL Final   08/19/2014 1.10 0.82 - 1.77 ng/dL Final     Prediabetes  Denies signs and symptoms of hyperglycemia  Check A1C today    ROS:     ROS  As stated in HPI, otherwise all others negative. The problem list was updated as a part of today's visit.   Patient Active Problem List   Diagnosis Code    Essential hypertension with goal blood pressure less than 140/90 I10    Obesity E66.9    Cancer of thyroid ([address] C73    Iron deficiency anemia D50.9    S/P thyroidectomy E89.0    Urge incontinence N39.41    Postoperative hypothyroidism E89.0    Prediabetes R73.03       The PS,  were reviewed. SH:  Social History     Tobacco Use    Smoking status: Never Smoker    Smokeless tobacco: Never Used   Substance Use Topics    Alcohol use: No    Drug use: No       Medications/Allergies:  Current Outpatient Medications on File Prior to Visit   Medication Sig Dispense Refill    Synthroid 125 mcg tablet take 1 tablet by mouth once daily before breakfast 90 Tablet 0    ferrous sulfate 325 mg (65 mg iron) tablet Take 1 Tablet by mouth daily. 60 Tablet 5    losartan-hydroCHLOROthiazide (HYZAAR) 100-25 mg per tablet Take 1 Tablet by mouth daily. 90 Tablet 3    ORTHO-NOVUM 7/7/7, 28, 0.5/0.75/1 mg- 35 mcg tab Take 1 Tab by mouth daily. 1    cholecalciferol, VITAMIN D3, (VITAMIN D3) 5,000 unit tab tablet Take  by mouth daily.  calcium carbonate (CALTREX) 600 mg (1,500 mg) tablet Take 600 mg by mouth two (2) times a day.  [DISCONTINUED] fluticasone propionate (Flovent HFA) 110 mcg/actuation inhaler inhale 2 puffs by mouth and INTO THE LUNGS every 12 hours (Patient taking differently: inhale 2 puffs by mouth and INTO THE LUNGS every 12 hours as needed) 12 g 5    [DISCONTINUED] albuterol (Ventolin HFA) 90 mcg/actuation inhaler inhale 1 puff by mouth BEFORE EXERCISING (Patient taking differently: inhale 1-2  Puffs Q4-6 Hrs Prn sob) 1 Inhaler 3     No current facility-administered medications on file prior to visit. Allergies   Allergen Reactions    Codeine Nausea and Vomiting       Objective:  Visit Vitals  /80   Pulse 75   Temp 97.6 °F (36.4 °C) (Temporal)   Resp 18   Ht 5' 6\" (1.676 m)   Wt 187 lb (84.8 kg)   SpO2 100%   BMI 30.18 kg/m²    Body mass index is 30.18 kg/m². Physical assessment  Physical Exam  Vitals and nursing note reviewed. Eyes:      Conjunctiva/sclera: Conjunctivae normal.      Pupils: Pupils are equal, round, and reactive to light. Cardiovascular:      Rate and Rhythm: Normal rate and regular rhythm. Heart sounds: Normal heart sounds. No murmur heard. No friction rub. No gallop. Pulmonary:      Effort: Pulmonary effort is normal.      Breath sounds: Normal breath sounds. Musculoskeletal:         General: Normal range of motion. Cervical back: Normal range of motion. Skin:     General: Skin is warm and dry. Neurological:      Mental Status: She is alert. Labwork and Ancillary Studies:    CBC w/Diff  Lab Results   Component Value Date/Time    WBC 9.5 06/01/2021 02:59 PM    HGB 11.4 (L) 06/01/2021 02:59 PM    PLATELET 999 12/27/1155 02:59 PM         Basic Metabolic Profile  Lab Results   Component Value Date/Time    Sodium 138 05/24/2022 08:28 AM    Potassium 4.1 05/24/2022 08:28 AM    Chloride 104 05/24/2022 08:28 AM    CO2 26 05/24/2022 08:28 AM    Anion gap 8 05/24/2022 08:28 AM    Glucose 90 05/24/2022 08:28 AM    BUN 16 05/24/2022 08:28 AM    Creatinine 0.90 05/24/2022 08:28 AM    BUN/Creatinine ratio 18 05/24/2022 08:28 AM    GFR est AA >60 05/24/2022 08:28 AM    GFR est non-AA >60 05/24/2022 08:28 AM    Calcium 9.0 05/24/2022 08:28 AM        Cholesterol  Lab Results   Component Value Date/Time    Cholesterol, total 167 06/01/2021 02:59 PM    HDL Cholesterol 78 (H) 06/01/2021 02:59 PM    LDL, calculated 78.2 06/01/2021 02:59 PM    Triglyceride 54 06/01/2021 02:59 PM    CHOL/HDL Ratio 2.1 06/01/2021 02:59 PM           I have discussed the diagnosis with the patient and the intended plan as seen in the above orders. The patient has received an After-Visit Summary and questions were answered concerning future plans. An After Visit Summary was printed and given to the patient. All diagnosis have been discussed with the patient and all of the patient's questions have been answered. Follow-up and Dispositions    · Return in about 6 months (around 12/6/2022) for HTN, hypothyroid, 15 min, office only, with, labs prior.            Andrew Hall, [facility] [address] [facility]   [address] [facility] [address]  [id number]

## 2022-09-27 RX ORDER — ALBUTEROL SULFATE 90 UG/1
AEROSOL, METERED RESPIRATORY (INHALATION)
Qty: 18 G | Refills: 3 | Status: SHIPPED | OUTPATIENT
Start: 2022-09-27

## 2022-11-20 DIAGNOSIS — I10 ESSENTIAL HYPERTENSION WITH GOAL BLOOD PRESSURE LESS THAN 140/90: ICD-10-CM

## 2022-11-20 RX ORDER — LOSARTAN POTASSIUM AND HYDROCHLOROTHIAZIDE 25; 100 MG/1; MG/1
TABLET ORAL
Qty: 90 TABLET | Refills: 3 | Status: SHIPPED | OUTPATIENT
Start: 2022-11-20

## 2022-11-29 ENCOUNTER — HOSPITAL ENCOUNTER (OUTPATIENT)
Dept: LAB | Age: 52
Discharge: HOME | End: 2022-11-29
Payer: COMMERCIAL

## 2022-11-29 LAB
ALBUMIN SERPL-MCNC: 3.4 G/DL (ref 3.4–5)
ALBUMIN/GLOB SERPL: 1 {RATIO} (ref 0.8–1.7)
ALP SERPL-CCNC: 46 U/L (ref 45–117)
ALT SERPL-CCNC: 23 U/L (ref 13–56)
ANION GAP SERPL CALC-SCNC: 8 MMOL/L (ref 3–18)
AST SERPL-CCNC: 24 U/L (ref 10–38)
BILIRUB SERPL-MCNC: 0.6 MG/DL (ref 0.2–1)
BUN SERPL-MCNC: 16 MG/DL (ref 7–18)
BUN/CREAT SERPL: 17 (ref 12–20)
CALCIUM SERPL-MCNC: 9.1 MG/DL (ref 8.5–10.1)
CHLORIDE SERPL-SCNC: 104 MMOL/L (ref 100–111)
CO2 SERPL-SCNC: 26 MMOL/L (ref 21–32)
CREAT SERPL-MCNC: 0.93 MG/DL (ref 0.6–1.3)
GLOBULIN SER CALC-MCNC: 3.5 G/DL (ref 2–4)
GLUCOSE SERPL-MCNC: 103 MG/DL (ref 74–99)
POTASSIUM SERPL-SCNC: 4 MMOL/L (ref 3.5–5.5)
PROT SERPL-MCNC: 6.9 G/DL (ref 6.4–8.2)
SODIUM SERPL-SCNC: 138 MMOL/L (ref 136–145)
T4 FREE SERPL-MCNC: 1.7 NG/DL (ref 0.7–1.5)
TSH SERPL DL<=0.05 MIU/L-ACNC: 6.88 UIU/ML (ref 0.36–3.74)

## 2022-11-29 PROCEDURE — 80053 COMPREHEN METABOLIC PANEL: CPT

## 2022-11-29 PROCEDURE — 36415 COLL VENOUS BLD VENIPUNCTURE: CPT

## 2022-11-29 PROCEDURE — 84439 ASSAY OF FREE THYROXINE: CPT

## 2022-12-06 ENCOUNTER — OFFICE VISIT (OUTPATIENT)
Dept: FAMILY MEDICINE CLINIC | Age: 52
End: 2022-12-06
Payer: COMMERCIAL

## 2022-12-06 VITALS
SYSTOLIC BLOOD PRESSURE: 130 MMHG | HEIGHT: 66 IN | RESPIRATION RATE: 18 BRPM | OXYGEN SATURATION: 99 % | BODY MASS INDEX: 30.18 KG/M2 | DIASTOLIC BLOOD PRESSURE: 80 MMHG | HEART RATE: 90 BPM | TEMPERATURE: 98.3 F

## 2022-12-06 DIAGNOSIS — E89.0 POSTOPERATIVE HYPOTHYROIDISM: ICD-10-CM

## 2022-12-06 DIAGNOSIS — I10 ESSENTIAL HYPERTENSION WITH GOAL BLOOD PRESSURE LESS THAN 140/90: Primary | ICD-10-CM

## 2022-12-06 PROCEDURE — 3078F DIAST BP <80 MM HG: CPT | Performed by: NURSE PRACTITIONER

## 2022-12-06 PROCEDURE — 99214 OFFICE O/P EST MOD 30 MIN: CPT | Performed by: NURSE PRACTITIONER

## 2022-12-06 PROCEDURE — 3074F SYST BP LT 130 MM HG: CPT | Performed by: NURSE PRACTITIONER

## 2022-12-06 RX ORDER — LEVOTHYROXINE SODIUM 125 UG/1
TABLET ORAL
Qty: 90 TABLET | Refills: 1
Start: 2022-12-06

## 2022-12-06 RX ORDER — LEVOTHYROXINE SODIUM 125 UG/1
125 TABLET ORAL
Qty: 90 TABLET | Refills: 1 | Status: CANCELLED | OUTPATIENT
Start: 2022-12-06

## 2022-12-06 NOTE — PROGRESS NOTES
Room 7    When asked if patient has any concerns she would like to address with LAWSON Wells patient states no . Did patient bring someone? No    Did the patient have DME equipment? No     Did you take your medication today? Yes Patient states I only took the Synthroid and I will take the one for my blood pressure when I leave the office . 1. \"Have you been to the ER, urgent care clinic since your last visit? Hospitalized since your last visit? \" No    2. \"Have you seen or consulted any other health care providers outside of the [address] [place] [address] since your last visit? \" No     3. For patients aged 39-70: Has the patient had a colonoscopy / FIT/ Cologuard? Yes - no Care Gap present      If the patient is female:    4. For patients aged 41-77: Has the patient had a mammogram within the past 2 years? Yes - no Care Gap present      5. For patients aged 21-65: Has the patient had a pap smear?  Yes - no Care Gap present        3 most recent PHQ Screens 12/6/2022   Little interest or pleasure in doing things Not at all   Feeling down, depressed, irritable, or hopeless Not at all   Total Score PHQ 2 0         Health Maintenance Due   Topic Date Due    COVID-19 Vaccine (4 - Booster for Pfizer series) 01/25/2022       Learning Assessment 1/21/2015   PRIMARY LEARNER Patient   HIGHEST LEVEL OF EDUCATION - PRIMARY LEARNER  4 YEARS OF COLLEGE   BARRIERS PRIMARY LEARNER NONE   PRIMARY LANGUAGE ENGLISH    NEED -   LEARNER PREFERENCE PRIMARY DEMONSTRATION   LEARNING SPECIAL TOPICS -   ANSWERED BY patient   RELATIONSHIP SELF

## 2022-12-06 NOTE — PROGRESS NOTES
Maria T Elliott. Formerly [facility], later [facility] 86      Augusto Lane is a 46 y.o. female and presents with Follow Up Chronic Condition, Hypertension, and Hypothyroidism       Assessment/Plan:    Diagnoses and all orders for this visit:    1. Essential hypertension with goal blood pressure less than 140/90  Endorses medication compliance, Follow up labs prior to next visit, and Blood pressure stable, continue hyzaar at same dose      2. Postoperative hypothyroidism  -     TSH 3RD GENERATION; Future  -     Synthroid 125 mcg tablet; Take on tab 6 days a week and 1.5 tab one day a week  Endorses medication compliance, Follow up labs prior to next visit, Endorses symptoms consistent with hypothyroidism, and Medication changes made: levothyroxine increased       Follow-up and Dispositions    Return in about 6 weeks (around 1/17/2023) for Lab, AND, 6month, CPE, w/o gyn, 15 min, office only, w/labs prior. Health Maintenance:   Health Maintenance   Topic Date Due    COVID-19 Vaccine (4 - Booster for Pfizer series) 01/25/2022    Shingrix Vaccine Age 50> (2 of 2) 12/14/2022    Depression Screen  06/06/2023    A1C test (Diabetic or Prediabetic)  06/06/2023    Breast Cancer Screen Mammogram  03/16/2024    Cervical cancer screen  06/01/2026    Lipid Screen  06/01/2026    DTaP/Tdap/Td series (2 - Td or Tdap) 05/15/2027    Colorectal Cancer Screening Combo  12/23/2030    Hepatitis C Screening  Completed    Flu Vaccine  Completed    Pneumococcal 0-64 years  Aged Out        Subjective:    Labs obtained prior to visit? YES  Reviewed with patient? Yes    Hypertension:  Visit Vitals  BP (!) 142/84   Pulse 90   Temp 98.3 °F (36.8 °C) (Temporal)   Resp 18   Ht 5' 6\" (1.676 m)   SpO2 99%   BMI 30.18 kg/m²      Patient reports taking medications as instructed. yes   Medication side effects noted. no  Headache upon wakening.  no   Home BP monitoring: Does not check  Do you experience chest pain/pressure or SOB with exertion? no  Maintain a low Sodium diet? yes  Key CAD CHF Meds               losartan-hydroCHLOROthiazide (HYZAAR) 100-25 mg per tablet (Taking) take 1 tablet by mouth once daily          BUN   Date Value Ref Range Status   11/29/2022 16 7.0 - 18 MG/DL Final     Creatinine   Date Value Ref Range Status   11/29/2022 0.93 0.6 - 1.3 MG/DL Final     GFR est AA   Date Value Ref Range Status   05/24/2022 >60 >60 ml/min/1.73m2 Final     Potassium   Date Value Ref Range Status   11/29/2022 4.0 3.5 - 5.5 mmol/L Final         Thyroid Review:  Patient is seen for followup of hypothyroidism. Taking medications daily either 30 minutes prior to or 2 hours after eating? YES  Thyroid ROS: endorses: feeling cold and cold intolerance. TSH   Date Value Ref Range Status   11/29/2022 6.88 (H) 0.36 - 3.74 uIU/mL Final   05/24/2022 3.00 0.36 - 3.74 uIU/mL Final   11/29/2021 1.44 0.36 - 3.74 uIU/mL Final   06/01/2021 0.30 (L) 0.36 - 3.74 uIU/mL Final   07/15/2020 0.88 0.36 - 3.74 uIU/mL Final     T4, Free   Date Value Ref Range Status   11/29/2022 1.7 (H) 0.7 - 1.5 NG/DL Final   05/24/2022 1.7 (H) 0.7 - 1.5 NG/DL Final   11/29/2021 1.2 0.7 - 1.5 NG/DL Final   06/01/2021 1.5 0.7 - 1.5 NG/DL Final   01/18/2019 1.9 (H) 0.7 - 1.5 NG/DL Final         ROS:     ROS  As stated in HPI, otherwise all others negative. The problem list was updated as a part of today's visit. Patient Active Problem List   Diagnosis Code    Essential hypertension with goal blood pressure less than 140/90 I10    Obesity E66.9    Cancer of thyroid ([facility] [address] C73    Iron deficiency anemia D50.9    S/P thyroidectomy E89.0    Urge incontinence N39.41    Postoperative hypothyroidism E89.0    Prediabetes R73.03       The PSH, FH were reviewed.       SH:  Social History     Tobacco Use    Smoking status: Never    Smokeless tobacco: Never   Substance Use Topics    Alcohol use: No    Drug use: No       Medications/Allergies:  Current Outpatient Medications on File Prior to Visit   Medication Sig Dispense Refill    losartan-hydroCHLOROthiazide (HYZAAR) 100-25 mg per tablet take 1 tablet by mouth once daily 90 Tablet 3    Ventolin HFA 90 mcg/actuation inhaler inhale 1 puff by mouth and INTO THE LUNGS every 4 hours if needed before EXERCISING 18 g 3    ferrous sulfate 325 mg (65 mg iron) tablet Take 1 Tablet by mouth daily. 60 Tablet 5    ORTHO-NOVUM 7/7/7, 28, 0.5/0.75/1 mg- 35 mcg tab Take 1 Tab by mouth daily. 1    cholecalciferol, VITAMIN D3, (VITAMIN D3) 5,000 unit tab tablet Take  by mouth daily. calcium carbonate (CALTREX) 600 mg (1,500 mg) tablet Take 600 mg by mouth two (2) times a day. [DISCONTINUED] Synthroid 125 mcg tablet take 1 tablet by mouth once daily before breakfast 90 Tablet 1     No current facility-administered medications on file prior to visit. Allergies   Allergen Reactions    Codeine Nausea and Vomiting       Objective:  Visit Vitals  BP (!) 142/84   Pulse 90   Temp 98.3 °F (36.8 °C) (Temporal)   Resp 18   Ht 5' 6\" (1.676 m)   SpO2 99%   BMI 30.18 kg/m²    Body mass index is 30.18 kg/m².     Physical assessment  Physical Exam      Labwork and Ancillary Studies:    CBC w/Diff  Lab Results   Component Value Date/Time    WBC 9.5 06/01/2021 02:59 PM    HGB 11.4 (L) 06/01/2021 02:59 PM    PLATELET 375 79/57/1092 02:59 PM         Basic Metabolic Profile  Lab Results   Component Value Date/Time    Sodium 138 11/29/2022 08:48 AM    Potassium 4.0 11/29/2022 08:48 AM    Chloride 104 11/29/2022 08:48 AM    CO2 26 11/29/2022 08:48 AM    Anion gap 8 11/29/2022 08:48 AM    Glucose 103 (H) 11/29/2022 08:48 AM    BUN 16 11/29/2022 08:48 AM    Creatinine 0.93 11/29/2022 08:48 AM    BUN/Creatinine ratio 17 11/29/2022 08:48 AM    GFR est AA >60 05/24/2022 08:28 AM    GFR est non-AA >60 05/24/2022 08:28 AM    Calcium 9.1 11/29/2022 08:48 AM        Cholesterol  Lab Results   Component Value Date/Time    Cholesterol, total 167 06/01/2021 02:59 PM    HDL Cholesterol 78 (H) 06/01/2021 02:59 PM    LDL, calculated 78.2 06/01/2021 02:59 PM    Triglyceride 54 06/01/2021 02:59 PM    CHOL/HDL Ratio 2.1 06/01/2021 02:59 PM           I have discussed the diagnosis with the patient and the intended plan as seen in the above orders. The patient has received an After-Visit Summary and questions were answered concerning future plans. An After Visit Summary was printed and given to the patient. All diagnosis have been discussed with the patient and all of the patient's questions have been answered. Follow-up and Dispositions    Return in about 6 weeks (around 1/17/2023) for Lab, AND, 6month, CPE, w/o gyn, 15 min, office only, w/labs prior. Taylor Wheeler, [facility]  [address] [id number] [address] [facility] [address]  [address]

## 2023-01-17 ENCOUNTER — HOSPITAL ENCOUNTER (OUTPATIENT)
Dept: LAB | Age: 53
Discharge: HOME | End: 2023-01-17
Payer: COMMERCIAL

## 2023-01-17 LAB — TSH SERPL DL<=0.05 MIU/L-ACNC: 0.94 UIU/ML (ref 0.36–3.74)

## 2023-01-17 PROCEDURE — 36415 COLL VENOUS BLD VENIPUNCTURE: CPT

## 2023-01-17 PROCEDURE — 84443 ASSAY THYROID STIM HORMONE: CPT

## 2023-02-08 DIAGNOSIS — E89.0 POSTOPERATIVE HYPOTHYROIDISM: ICD-10-CM

## 2023-02-08 RX ORDER — LEVOTHYROXINE SODIUM 125 UG/1
TABLET ORAL
Qty: 90 TABLET | Refills: 1 | Status: SHIPPED | OUTPATIENT
Start: 2023-02-08

## 2023-02-27 RX ORDER — FERROUS SULFATE 325(65) MG
TABLET ORAL
Qty: 180 TABLET | Refills: 1 | Status: SHIPPED | OUTPATIENT
Start: 2023-02-27

## 2023-03-22 RX ORDER — DEXAMETHASONE 4 MG/1
TABLET ORAL
Qty: 12 G | Refills: 1 | Status: SHIPPED | OUTPATIENT
Start: 2023-03-22

## 2023-06-02 ENCOUNTER — TELEPHONE (OUTPATIENT)
Facility: CLINIC | Age: 53
End: 2023-06-02

## 2023-06-02 DIAGNOSIS — I10 ESSENTIAL HYPERTENSION WITH GOAL BLOOD PRESSURE LESS THAN 140/90: Primary | ICD-10-CM

## 2023-06-02 DIAGNOSIS — R73.03 PREDIABETES: ICD-10-CM

## 2023-06-02 DIAGNOSIS — E89.0 POSTOPERATIVE HYPOTHYROIDISM: ICD-10-CM

## 2023-06-05 NOTE — TELEPHONE ENCOUNTER
Called patient advised her labs have been entered.   Patient verbalized understanding and is in agreement with this plan of care

## 2023-06-28 RX ORDER — LEVOTHYROXINE SODIUM 125 MCG
TABLET ORAL
Qty: 90 TABLET | Refills: 1 | Status: SHIPPED | OUTPATIENT
Start: 2023-06-28

## 2023-07-19 ENCOUNTER — HOSPITAL ENCOUNTER (OUTPATIENT)
Facility: HOSPITAL | Age: 53
Setting detail: SPECIMEN
Discharge: HOME | End: 2023-07-22
Payer: COMMERCIAL

## 2023-07-19 LAB
ALBUMIN SERPL-MCNC: 3 G/DL (ref 3.4–5)
ALBUMIN/GLOB SERPL: 0.9 (ref 0.8–1.7)
ALP SERPL-CCNC: 51 U/L (ref 45–117)
ALT SERPL-CCNC: 16 U/L (ref 13–56)
ANION GAP SERPL CALC-SCNC: 5 MMOL/L (ref 3–18)
AST SERPL-CCNC: 20 U/L (ref 10–38)
BILIRUB SERPL-MCNC: 0.4 MG/DL (ref 0.2–1)
BUN SERPL-MCNC: 11 MG/DL (ref 7–18)
BUN/CREAT SERPL: 13 (ref 12–20)
CALCIUM SERPL-MCNC: 8 MG/DL (ref 8.5–10.1)
CHLORIDE SERPL-SCNC: 107 MMOL/L (ref 100–111)
CO2 SERPL-SCNC: 27 MMOL/L (ref 21–32)
CREAT SERPL-MCNC: 0.84 MG/DL (ref 0.6–1.3)
EST. AVERAGE GLUCOSE BLD GHB EST-MCNC: 120 MG/DL
GLOBULIN SER CALC-MCNC: 3.3 G/DL (ref 2–4)
GLUCOSE SERPL-MCNC: 92 MG/DL (ref 74–99)
HBA1C MFR BLD: 5.8 % (ref 4.2–5.6)
POTASSIUM SERPL-SCNC: 3.9 MMOL/L (ref 3.5–5.5)
PROT SERPL-MCNC: 6.3 G/DL (ref 6.4–8.2)
SODIUM SERPL-SCNC: 139 MMOL/L (ref 136–145)
TSH SERPL DL<=0.05 MIU/L-ACNC: 6.85 UIU/ML (ref 0.36–3.74)

## 2023-07-19 PROCEDURE — 36415 COLL VENOUS BLD VENIPUNCTURE: CPT

## 2023-07-19 PROCEDURE — 83036 HEMOGLOBIN GLYCOSYLATED A1C: CPT

## 2023-07-19 PROCEDURE — 84443 ASSAY THYROID STIM HORMONE: CPT

## 2023-07-19 PROCEDURE — 80053 COMPREHEN METABOLIC PANEL: CPT

## 2023-07-24 ENCOUNTER — OFFICE VISIT (OUTPATIENT)
Facility: CLINIC | Age: 53
End: 2023-07-24
Payer: COMMERCIAL

## 2023-07-24 VITALS
HEART RATE: 83 BPM | OXYGEN SATURATION: 95 % | RESPIRATION RATE: 18 BRPM | TEMPERATURE: 98 F | BODY MASS INDEX: 34.55 KG/M2 | HEIGHT: 66 IN | WEIGHT: 215 LBS | SYSTOLIC BLOOD PRESSURE: 138 MMHG | DIASTOLIC BLOOD PRESSURE: 87 MMHG

## 2023-07-24 DIAGNOSIS — Z00.00 WELL WOMAN EXAM (NO GYNECOLOGICAL EXAM): Primary | ICD-10-CM

## 2023-07-24 DIAGNOSIS — I10 ESSENTIAL HYPERTENSION WITH GOAL BLOOD PRESSURE LESS THAN 140/90: ICD-10-CM

## 2023-07-24 DIAGNOSIS — Z12.31 ENCOUNTER FOR SCREENING MAMMOGRAM FOR MALIGNANT NEOPLASM OF BREAST: ICD-10-CM

## 2023-07-24 DIAGNOSIS — E89.0 POSTOPERATIVE HYPOTHYROIDISM: ICD-10-CM

## 2023-07-24 PROBLEM — R73.03 PREDIABETES: Status: ACTIVE | Noted: 2020-01-02

## 2023-07-24 PROCEDURE — 3075F SYST BP GE 130 - 139MM HG: CPT | Performed by: NURSE PRACTITIONER

## 2023-07-24 PROCEDURE — 3079F DIAST BP 80-89 MM HG: CPT | Performed by: NURSE PRACTITIONER

## 2023-07-24 PROCEDURE — 99396 PREV VISIT EST AGE 40-64: CPT | Performed by: NURSE PRACTITIONER

## 2023-07-24 SDOH — ECONOMIC STABILITY: HOUSING INSECURITY
IN THE LAST 12 MONTHS, WAS THERE A TIME WHEN YOU DID NOT HAVE A STEADY PLACE TO SLEEP OR SLEPT IN A SHELTER (INCLUDING NOW)?: NO

## 2023-07-24 SDOH — ECONOMIC STABILITY: INCOME INSECURITY: HOW HARD IS IT FOR YOU TO PAY FOR THE VERY BASICS LIKE FOOD, HOUSING, MEDICAL CARE, AND HEATING?: NOT HARD AT ALL

## 2023-07-24 SDOH — ECONOMIC STABILITY: FOOD INSECURITY: WITHIN THE PAST 12 MONTHS, YOU WORRIED THAT YOUR FOOD WOULD RUN OUT BEFORE YOU GOT MONEY TO BUY MORE.: NEVER TRUE

## 2023-07-24 SDOH — ECONOMIC STABILITY: FOOD INSECURITY: WITHIN THE PAST 12 MONTHS, THE FOOD YOU BOUGHT JUST DIDN'T LAST AND YOU DIDN'T HAVE MONEY TO GET MORE.: NEVER TRUE

## 2023-07-24 ASSESSMENT — PATIENT HEALTH QUESTIONNAIRE - PHQ9
SUM OF ALL RESPONSES TO PHQ QUESTIONS 1-9: 2
SUM OF ALL RESPONSES TO PHQ9 QUESTIONS 1 & 2: 2
2. FEELING DOWN, DEPRESSED OR HOPELESS: 1
1. LITTLE INTEREST OR PLEASURE IN DOING THINGS: 1
SUM OF ALL RESPONSES TO PHQ QUESTIONS 1-9: 2

## 2023-07-24 ASSESSMENT — ENCOUNTER SYMPTOMS
GASTROINTESTINAL NEGATIVE: 1
EYES NEGATIVE: 1
RESPIRATORY NEGATIVE: 1

## 2023-07-24 NOTE — PROGRESS NOTES
Room 7    Khushi Grant had concerns including Annual Exam. for today's visit . When asked if patient has any concerns she/he would like to address with N.PSanjiv Floyce Spray . N.P. Floyce Spray has been notified  of patient concerns . Did patient bring someone? No    Did the patient have DME equipment? No     Did you take your medication today? Yes      1. \"Have you been to the ER, urgent care clinic since your last visit? Hospitalized since your last visit? \" . NO    2. \"Have you seen or consulted any other health care providers outside of the [address] [place] [address] since your last visit? \" No     3. For patients aged 43-73: Has the patient had a colonoscopy / FIT/ Cologuard? Yes      If the patient is female:    4. For patients aged 43-66: Has the patient had a mammogram within the past 2 years? Order has been placed       5. For patients aged 21-65: Has the patient had a pap smear? {Cancer Care Gap present?  Yes         PHQ-9  7/24/2023   Little interest or pleasure in doing things 1   Little interest or pleasure in doing things -   Feeling down, depressed, or hopeless 1   PHQ-2 Score 2   Total Score PHQ 2 -   PHQ-9 Total Score 2          Health Maintenance Due   Topic Date Due    HIV screen  Never done    DTaP/Tdap/Td vaccine (1 - Tdap) Never done    COVID-19 Vaccine (5 - Booster for Pfizer series) 07/13/2023

## 2023-09-05 ENCOUNTER — HOSPITAL ENCOUNTER (OUTPATIENT)
Facility: HOSPITAL | Age: 53
Setting detail: SPECIMEN
Discharge: HOME | End: 2023-09-08
Payer: COMMERCIAL

## 2023-09-05 DIAGNOSIS — E89.0 POSTOPERATIVE HYPOTHYROIDISM: Primary | ICD-10-CM

## 2023-09-05 LAB
T4 FREE SERPL-MCNC: 1.7 NG/DL (ref 0.7–1.5)
TSH SERPL DL<=0.05 MIU/L-ACNC: 0.37 UIU/ML (ref 0.36–3.74)

## 2023-09-05 PROCEDURE — 84443 ASSAY THYROID STIM HORMONE: CPT

## 2023-09-05 PROCEDURE — 36415 COLL VENOUS BLD VENIPUNCTURE: CPT

## 2023-09-05 PROCEDURE — 84439 ASSAY OF FREE THYROXINE: CPT

## 2023-11-01 RX ORDER — LOSARTAN POTASSIUM AND HYDROCHLOROTHIAZIDE 25; 100 MG/1; MG/1
TABLET ORAL
Qty: 90 TABLET | Refills: 1 | Status: SHIPPED | OUTPATIENT
Start: 2023-11-01

## 2023-11-27 ENCOUNTER — OFFICE VISIT (OUTPATIENT)
Facility: CLINIC | Age: 53
End: 2023-11-27
Payer: COMMERCIAL

## 2023-11-27 VITALS
HEIGHT: 66 IN | HEART RATE: 96 BPM | BODY MASS INDEX: 30.86 KG/M2 | DIASTOLIC BLOOD PRESSURE: 67 MMHG | WEIGHT: 192 LBS | SYSTOLIC BLOOD PRESSURE: 124 MMHG | TEMPERATURE: 97.9 F | RESPIRATION RATE: 16 BRPM | OXYGEN SATURATION: 99 %

## 2023-11-27 DIAGNOSIS — E89.0 POSTOPERATIVE HYPOTHYROIDISM: ICD-10-CM

## 2023-11-27 DIAGNOSIS — R73.03 PREDIABETES: ICD-10-CM

## 2023-11-27 DIAGNOSIS — M54.31 RIGHT SIDED SCIATICA: ICD-10-CM

## 2023-11-27 DIAGNOSIS — I10 ESSENTIAL HYPERTENSION WITH GOAL BLOOD PRESSURE LESS THAN 140/90: Primary | ICD-10-CM

## 2023-11-27 DIAGNOSIS — D50.9 IRON DEFICIENCY ANEMIA, UNSPECIFIED IRON DEFICIENCY ANEMIA TYPE: ICD-10-CM

## 2023-11-27 PROBLEM — E66.811 CLASS 1 OBESITY DUE TO EXCESS CALORIES WITH SERIOUS COMORBIDITY AND BODY MASS INDEX (BMI) OF 34.0 TO 34.9 IN ADULT: Status: ACTIVE | Noted: 2023-11-27

## 2023-11-27 PROBLEM — E66.09 CLASS 1 OBESITY DUE TO EXCESS CALORIES WITH SERIOUS COMORBIDITY AND BODY MASS INDEX (BMI) OF 34.0 TO 34.9 IN ADULT: Status: ACTIVE | Noted: 2023-11-27

## 2023-11-27 PROCEDURE — 3078F DIAST BP <80 MM HG: CPT | Performed by: NURSE PRACTITIONER

## 2023-11-27 PROCEDURE — 99214 OFFICE O/P EST MOD 30 MIN: CPT | Performed by: NURSE PRACTITIONER

## 2023-11-27 PROCEDURE — 3074F SYST BP LT 130 MM HG: CPT | Performed by: NURSE PRACTITIONER

## 2023-11-27 RX ORDER — METHOCARBAMOL 750 MG/1
TABLET, FILM COATED ORAL
COMMUNITY
Start: 2023-10-21

## 2023-11-27 RX ORDER — ETODOLAC 400 MG/1
TABLET, FILM COATED ORAL
COMMUNITY
Start: 2023-10-21

## 2023-11-27 RX ORDER — GABAPENTIN 300 MG/1
CAPSULE ORAL
COMMUNITY
Start: 2023-10-26

## 2023-11-27 SDOH — ECONOMIC STABILITY: FOOD INSECURITY: WITHIN THE PAST 12 MONTHS, YOU WORRIED THAT YOUR FOOD WOULD RUN OUT BEFORE YOU GOT MONEY TO BUY MORE.: NEVER TRUE

## 2023-11-27 SDOH — ECONOMIC STABILITY: FOOD INSECURITY: WITHIN THE PAST 12 MONTHS, THE FOOD YOU BOUGHT JUST DIDN'T LAST AND YOU DIDN'T HAVE MONEY TO GET MORE.: NEVER TRUE

## 2023-11-27 SDOH — ECONOMIC STABILITY: INCOME INSECURITY: HOW HARD IS IT FOR YOU TO PAY FOR THE VERY BASICS LIKE FOOD, HOUSING, MEDICAL CARE, AND HEATING?: NOT HARD AT ALL

## 2023-11-27 ASSESSMENT — PATIENT HEALTH QUESTIONNAIRE - PHQ9
SUM OF ALL RESPONSES TO PHQ QUESTIONS 1-9: 0
1. LITTLE INTEREST OR PLEASURE IN DOING THINGS: 0
2. FEELING DOWN, DEPRESSED OR HOPELESS: 0
SUM OF ALL RESPONSES TO PHQ9 QUESTIONS 1 & 2: 0
SUM OF ALL RESPONSES TO PHQ QUESTIONS 1-9: 0

## 2023-11-27 NOTE — PROGRESS NOTES
Sirisha Anne presents today for   Chief Complaint   Patient presents with    Follow-up       Is someone accompanying this pt? No    Is the patient using any DME equipment during OV? No    Depression Screenin/27/2023     9:18 AM   PHQ-9    Little interest or pleasure in doing things 0   Feeling down, depressed, or hopeless 0   PHQ-2 Score 0   PHQ-9 Total Score 0               Health Maintenance reviewed and discussed and ordered per Provider. Health Maintenance Due   Topic Date Due    Hepatitis B vaccine (1 of 3 - 3-dose series) Never done    HIV screen  Never done    DTaP/Tdap/Td vaccine (1 - Tdap) Never done    Flu vaccine (1) 2023    COVID-19 Vaccine ( season) 2023   . 1. \"Have you been to the ER, urgent care clinic since your last visit? Hospitalized since your last visit? \" No    2. \"Have you seen or consulted any other health care providers outside of the [address] [place] [address] since your last visit? \" No    3. For patients over 45: Has the patient had a colonoscopy? Yes - no Care Gap present     If the patient is female:    4. For patients over 40: Has the patient had a mammogram? Yes - no Care Gap present    5. For patients over 21: Has the patient had a pap smear?  Yes - no Care Gap present

## 2024-02-12 RX ORDER — FERROUS SULFATE 325(65) MG
TABLET ORAL
Qty: 180 TABLET | Refills: 1 | Status: SHIPPED | OUTPATIENT
Start: 2024-02-12

## 2024-04-17 RX ORDER — LOSARTAN POTASSIUM AND HYDROCHLOROTHIAZIDE 25; 100 MG/1; MG/1
TABLET ORAL
Qty: 90 TABLET | Refills: 1 | Status: SHIPPED | OUTPATIENT
Start: 2024-04-17

## 2024-05-21 ENCOUNTER — HOSPITAL ENCOUNTER (OUTPATIENT)
Facility: HOSPITAL | Age: 54
Setting detail: SPECIMEN
Discharge: HOME | End: 2024-05-24
Payer: COMMERCIAL

## 2024-05-21 DIAGNOSIS — I10 ESSENTIAL HYPERTENSION WITH GOAL BLOOD PRESSURE LESS THAN 140/90: ICD-10-CM

## 2024-05-21 DIAGNOSIS — E89.0 POSTOPERATIVE HYPOTHYROIDISM: ICD-10-CM

## 2024-05-21 DIAGNOSIS — D50.9 IRON DEFICIENCY ANEMIA, UNSPECIFIED IRON DEFICIENCY ANEMIA TYPE: ICD-10-CM

## 2024-05-21 DIAGNOSIS — R73.03 PREDIABETES: ICD-10-CM

## 2024-05-21 LAB
ALBUMIN SERPL-MCNC: 3 G/DL (ref 3.4–5)
ALBUMIN/GLOB SERPL: 0.9 (ref 0.8–1.7)
ALP SERPL-CCNC: 55 U/L (ref 45–117)
ALT SERPL-CCNC: 20 U/L (ref 13–56)
ANION GAP SERPL CALC-SCNC: 4 MMOL/L (ref 3–18)
AST SERPL-CCNC: 17 U/L (ref 10–38)
BILIRUB SERPL-MCNC: 0.4 MG/DL (ref 0.2–1)
BUN SERPL-MCNC: 16 MG/DL (ref 7–18)
BUN/CREAT SERPL: 19 (ref 12–20)
CALCIUM SERPL-MCNC: 8.3 MG/DL (ref 8.5–10.1)
CHLORIDE SERPL-SCNC: 104 MMOL/L (ref 100–111)
CHOLEST SERPL-MCNC: 185 MG/DL
CO2 SERPL-SCNC: 28 MMOL/L (ref 21–32)
CREAT SERPL-MCNC: 0.86 MG/DL (ref 0.6–1.3)
ERYTHROCYTE [DISTWIDTH] IN BLOOD BY AUTOMATED COUNT: 14.9 % (ref 11.6–14.5)
EST. AVERAGE GLUCOSE BLD GHB EST-MCNC: 114 MG/DL
GLOBULIN SER CALC-MCNC: 3.4 G/DL (ref 2–4)
GLUCOSE SERPL-MCNC: 87 MG/DL (ref 74–99)
HBA1C MFR BLD: 5.6 % (ref 4.2–5.6)
HCT VFR BLD AUTO: 35 % (ref 35–45)
HDLC SERPL-MCNC: 86 MG/DL (ref 40–60)
HDLC SERPL: 2.2 (ref 0–5)
HGB BLD-MCNC: 11.8 G/DL (ref 12–16)
LDLC SERPL CALC-MCNC: 86.4 MG/DL (ref 0–100)
LIPID PANEL: ABNORMAL
MCH RBC QN AUTO: 31.6 PG (ref 24–34)
MCHC RBC AUTO-ENTMCNC: 33.7 G/DL (ref 31–37)
MCV RBC AUTO: 93.6 FL (ref 78–100)
NRBC # BLD: 0 K/UL (ref 0–0.01)
NRBC BLD-RTO: 0 PER 100 WBC
PLATELET # BLD AUTO: 335 K/UL (ref 135–420)
PMV BLD AUTO: 10.4 FL (ref 9.2–11.8)
POTASSIUM SERPL-SCNC: 3.7 MMOL/L (ref 3.5–5.5)
PROT SERPL-MCNC: 6.4 G/DL (ref 6.4–8.2)
RBC # BLD AUTO: 3.74 M/UL (ref 4.2–5.3)
SODIUM SERPL-SCNC: 136 MMOL/L (ref 136–145)
TRIGL SERPL-MCNC: 63 MG/DL
TSH SERPL DL<=0.05 MIU/L-ACNC: 9.77 UIU/ML (ref 0.36–3.74)
VLDLC SERPL CALC-MCNC: 12.6 MG/DL
WBC # BLD AUTO: 6.9 K/UL (ref 4.6–13.2)

## 2024-05-21 PROCEDURE — 80053 COMPREHEN METABOLIC PANEL: CPT

## 2024-05-21 PROCEDURE — 36415 COLL VENOUS BLD VENIPUNCTURE: CPT

## 2024-05-21 PROCEDURE — 83036 HEMOGLOBIN GLYCOSYLATED A1C: CPT

## 2024-05-21 PROCEDURE — 85027 COMPLETE CBC AUTOMATED: CPT

## 2024-05-21 PROCEDURE — 84443 ASSAY THYROID STIM HORMONE: CPT

## 2024-05-21 PROCEDURE — 80061 LIPID PANEL: CPT

## 2024-05-28 ENCOUNTER — OFFICE VISIT (OUTPATIENT)
Facility: CLINIC | Age: 54
End: 2024-05-28
Payer: COMMERCIAL

## 2024-05-28 VITALS
TEMPERATURE: 98.1 F | RESPIRATION RATE: 18 BRPM | OXYGEN SATURATION: 97 % | DIASTOLIC BLOOD PRESSURE: 82 MMHG | BODY MASS INDEX: 30.53 KG/M2 | SYSTOLIC BLOOD PRESSURE: 130 MMHG | HEIGHT: 66 IN | HEART RATE: 72 BPM | WEIGHT: 190 LBS

## 2024-05-28 DIAGNOSIS — I10 ESSENTIAL HYPERTENSION WITH GOAL BLOOD PRESSURE LESS THAN 140/90: Primary | ICD-10-CM

## 2024-05-28 DIAGNOSIS — R73.03 PREDIABETES: ICD-10-CM

## 2024-05-28 DIAGNOSIS — E89.0 POSTOPERATIVE HYPOTHYROIDISM: ICD-10-CM

## 2024-05-28 PROCEDURE — 3079F DIAST BP 80-89 MM HG: CPT | Performed by: NURSE PRACTITIONER

## 2024-05-28 PROCEDURE — 3075F SYST BP GE 130 - 139MM HG: CPT | Performed by: NURSE PRACTITIONER

## 2024-05-28 PROCEDURE — 99214 OFFICE O/P EST MOD 30 MIN: CPT | Performed by: NURSE PRACTITIONER

## 2024-05-28 ASSESSMENT — PATIENT HEALTH QUESTIONNAIRE - PHQ9
SUM OF ALL RESPONSES TO PHQ QUESTIONS 1-9: 0
SUM OF ALL RESPONSES TO PHQ QUESTIONS 1-9: 0
2. FEELING DOWN, DEPRESSED OR HOPELESS: NOT AT ALL
SUM OF ALL RESPONSES TO PHQ9 QUESTIONS 1 & 2: 0
SUM OF ALL RESPONSES TO PHQ QUESTIONS 1-9: 0
1. LITTLE INTEREST OR PLEASURE IN DOING THINGS: NOT AT ALL
SUM OF ALL RESPONSES TO PHQ QUESTIONS 1-9: 0

## 2024-05-28 NOTE — PROGRESS NOTES
Room 9     Gracia Stevens had concerns including Follow-up Chronic Condition, Hypertension, and Hypothyroidism. for today's visit .     When asked if patient has any concerns she would like to address with KRISTOPHER Coyle patient states I have no concerns . KRISTOPHER Coyle has been notified  of patient concerns .    Did patient bring someone? No    Did the patient have DME equipment? No     Did you take your medication today? Yes       1. \"Have you been to the ER, urgent care clinic since your last visit?  Hospitalized since your last visit?\" .NO    2. \"Have you seen or consulted any other health care providers outside of the [facility] since your last visit?\" No    3. For patients aged 45-75: Has the patient had a colonoscopy / FIT/ Cologuard? Yes      If the patient is female:    4. For patients aged 40-74: Has the patient had a mammogram within the past 2 years? yes      5. For patients aged 21-65: Has the patient had a pap smear? {Cancer Care Gap present? Yes             5/28/2024     8:44 AM   PHQ-9    Little interest or pleasure in doing things 0   Feeling down, depressed, or hopeless 0   PHQ-2 Score 0   PHQ-9 Total Score 0          Health Maintenance Due   Topic Date Due    Hepatitis B vaccine (1 of 3 - 3-dose series) Never done    HIV screen  Never done    DTaP/Tdap/Td vaccine (1 - Tdap) Never done    COVID-19 Vaccine (5 - 2023-24 season) 09/01/2023

## 2024-05-28 NOTE — PROGRESS NOTES
[address] [place] [address]               [phone]      Gracia Stevens is a 54 y.o. female and presents with Follow-up Chronic Condition, Hypertension, and Hypothyroidism       Assessment/Plan:    1. Essential hypertension with goal blood pressure less than 140/90  -     Comprehensive Metabolic Panel; Future  -     CBC; Future  2. Postoperative hypothyroidism  -     Lipid Panel; Future  -     TSH; Future  3. Prediabetes  -     Hemoglobin A1C; Future     Blood pressure controlled, continue losartan hctz at same dose  Hypothyroid uncontrolled, TSH 9.77, this is now being managed by [facility]  Labs prior to next visit  Patient verbalized understanding and is in agreement with this plan of care      Follow up and disposition:   Return in about 4 months (around 9/28/2024) for Physical, w/ogyn, HTN, hypothyroid, 30min, office, w/labsprior.      Subjective:    Labs obtained prior to visit? Yes  Reviewed with patient? yes    Hypertension:  /82 Comment: feet flat on floor  Pulse 72   Temp 98.1 °F (36.7 °C) (Temporal)   Resp 18   Ht 1.676 m (5' 6\")   Wt 86.2 kg (190 lb)   SpO2 97%   BMI 30.67 kg/m²    Patient reports taking medications as instructed. Yes   Medication side effects noted no  Headache upon wakening.no   Home BP monitoring: no  Do you experience chest pain/pressure or SOB with exertion? no  Maintain a low Sodium diet? Yes  Lab Results   Component Value Date/Time    BUN 16 05/21/2024 08:15 AM    CREATININE 0.86 05/21/2024 08:15 AM    LABGLOM 80 05/21/2024 08:15 AM    LABGLOM >60 07/19/2023 08:56 AM    LABGLOM >60 11/29/2022 08:48 AM    K 3.7 05/21/2024 08:15 AM     Hypertension Medications       Antihypertensive Combinations       losartan-hydroCHLOROthiazide (HYZAAR) 100-25 MG per tablet take 1 tablet by mouth once daily           Endocrine Review  Patient is seen for followup of hypothyroidism and status post thyroid surgery total thyroidectomy.  Since last

## 2024-06-06 RX ORDER — LEVOTHYROXINE SODIUM 125 MCG
125 TABLET ORAL
Qty: 90 TABLET | Refills: 1 | Status: SHIPPED | OUTPATIENT
Start: 2024-06-06

## 2024-06-06 RX ORDER — LOSARTAN POTASSIUM AND HYDROCHLOROTHIAZIDE 25; 100 MG/1; MG/1
1 TABLET ORAL DAILY
Qty: 90 TABLET | Refills: 1 | Status: SHIPPED | OUTPATIENT
Start: 2024-06-06

## 2024-06-06 RX ORDER — FERROUS SULFATE 325(65) MG
1 TABLET ORAL DAILY
Qty: 180 TABLET | Refills: 1 | Status: SHIPPED | OUTPATIENT
Start: 2024-06-06

## 2024-09-19 LAB
A/G RATIO: 1.5 RATIO (ref 1.1–2.6)
ALBUMIN: 4 G/DL (ref 3.5–5)
ALP BLD-CCNC: 46 U/L (ref 25–115)
ALT SERPL-CCNC: 12 U/L (ref 5–40)
ANION GAP SERPL CALCULATED.3IONS-SCNC: 8 MMOL/L (ref 3–15)
AST SERPL-CCNC: 19 U/L (ref 10–37)
BILIRUB SERPL-MCNC: 0.3 MG/DL (ref 0.2–1.2)
BUN BLDV-MCNC: 19 MG/DL (ref 6–22)
CALCIUM SERPL-MCNC: 8.9 MG/DL (ref 8.4–10.5)
CHLORIDE BLD-SCNC: 99 MMOL/L (ref 98–110)
CHOLESTEROL, TOTAL: 150 MG/DL (ref 110–200)
CHOLESTEROL/HDL RATIO: 2 (ref 0–5)
CO2: 29 MMOL/L (ref 20–32)
CREAT SERPL-MCNC: 1 MG/DL (ref 0.5–1.2)
ESTIMATED AVERAGE GLUCOSE: 115 MG/DL (ref 91–123)
GFR, ESTIMATED: >60 ML/MIN/1.73 SQ.M.
GLOBULIN: 2.6 G/DL (ref 2–4)
GLUCOSE: 100 MG/DL (ref 70–99)
HBA1C MFR BLD: 5.7 % (ref 4.8–5.6)
HCT VFR BLD CALC: 33.2 % (ref 35.1–48)
HDLC SERPL-MCNC: 74 MG/DL
HEMOGLOBIN: 11.1 G/DL (ref 11.7–16)
LDL CHOLESTEROL: 60 MG/DL (ref 50–99)
LDL/HDL RATIO: 0.8
MCH RBC QN AUTO: 31 PG (ref 26–34)
MCHC RBC AUTO-ENTMCNC: 33 G/DL (ref 31–36)
MCV RBC AUTO: 94 FL (ref 80–99)
NON-HDL CHOLESTEROL: 76 MG/DL
PDW BLD-RTO: 13 % (ref 10–15.5)
PLATELET # BLD: 281 K/UL (ref 140–440)
PMV BLD AUTO: 10.3 FL (ref 9–13)
POTASSIUM SERPL-SCNC: 3.8 MMOL/L (ref 3.5–5.5)
RBC # BLD: 3.53 M/UL (ref 3.8–5.2)
SODIUM BLD-SCNC: 136 MMOL/L (ref 133–145)
TOTAL PROTEIN: 6.6 G/DL (ref 6.4–8.3)
TRIGL SERPL-MCNC: 76 MG/DL (ref 40–149)
TSH SERPL DL<=0.05 MIU/L-ACNC: 0.6 MCU/ML (ref 0.27–4.2)
VLDLC SERPL CALC-MCNC: 15 MG/DL (ref 8–30)
WBC # BLD: 7 K/UL (ref 4–11)

## 2024-09-26 ENCOUNTER — OFFICE VISIT (OUTPATIENT)
Facility: CLINIC | Age: 54
End: 2024-09-26
Payer: COMMERCIAL

## 2024-09-26 VITALS
OXYGEN SATURATION: 97 % | BODY MASS INDEX: 30.22 KG/M2 | HEART RATE: 79 BPM | SYSTOLIC BLOOD PRESSURE: 121 MMHG | WEIGHT: 188 LBS | RESPIRATION RATE: 16 BRPM | HEIGHT: 66 IN | TEMPERATURE: 98 F | DIASTOLIC BLOOD PRESSURE: 79 MMHG

## 2024-09-26 DIAGNOSIS — Z00.00 WELL WOMAN EXAM (NO GYNECOLOGICAL EXAM): Primary | ICD-10-CM

## 2024-09-26 DIAGNOSIS — E89.0 POSTOPERATIVE HYPOTHYROIDISM: ICD-10-CM

## 2024-09-26 DIAGNOSIS — I10 ESSENTIAL HYPERTENSION WITH GOAL BLOOD PRESSURE LESS THAN 140/90: ICD-10-CM

## 2024-09-26 PROCEDURE — 99396 PREV VISIT EST AGE 40-64: CPT | Performed by: NURSE PRACTITIONER

## 2024-09-26 PROCEDURE — 3078F DIAST BP <80 MM HG: CPT | Performed by: NURSE PRACTITIONER

## 2024-09-26 PROCEDURE — 3074F SYST BP LT 130 MM HG: CPT | Performed by: NURSE PRACTITIONER

## 2024-09-26 RX ORDER — LOSARTAN POTASSIUM AND HYDROCHLOROTHIAZIDE 25; 100 MG/1; MG/1
1 TABLET ORAL DAILY
Qty: 90 TABLET | Refills: 1 | Status: SHIPPED | OUTPATIENT
Start: 2024-09-26

## 2024-09-26 NOTE — PROGRESS NOTES
Gracia Stevens is a 54 y.o. female (: 1970) presenting to address:    Chief Complaint   Patient presents with    Annual Exam       There were no vitals filed for this visit.    Coordination of Care Questionaire:   1. \"Have you been to the ER, urgent care clinic since your last visit?  Hospitalized since your last visit?\" No     2. \"Have you seen or consulted any other health care providers outside of the [facility] since your last visit?\" Yes     3. For patients aged 45-75: Has the patient had a colonoscopy / FIT/ Cologuard? Yes      If the patient is female:    4. For patients aged 40-74: Has the patient had a mammogram within the past 2 years? Yes      5. For patients aged 21-65: Has the patient had a pap smear? Yes    Advanced Directive:   1. Do you have an Advanced Directive? No    2. Would you like information on Advanced Directives? No

## 2024-09-26 NOTE — PROGRESS NOTES
[address] [place] [address]               [phone]      Gracia Stevens is a 54 y.o. female and presents with Annual Exam       Assessment/Plan:    1. Well woman exam (no gynecological exam)  2. Essential hypertension with goal blood pressure less than 140/90  -     losartan-hydroCHLOROthiazide (HYZAAR) 100-25 MG per tablet; Take 1 tablet by mouth daily, Disp-90 tablet, R-1Requesting 1 year supplyNormal  -     Comprehensive Metabolic Panel; Future  -     Lipid Panel; Future  -     CBC; Future  3. Postoperative hypothyroidism  -     TSH; Future   Annual physical completed  Blood pressure controlled, continue hyzaar at same dose  Thyroid controlled, continue levothyroxine at same dose  Refills provided  Labs prior to next visit  Patient verbalized understanding and is in agreement with this plan of care        Follow up and disposition:   Return in about 6 months (around 3/26/2025) for HTN, 15min, office, w/labsprior.      Subjective:    Labs obtained prior to visit? Yes  Reviewed with patient? yes    Hypertension:  /79 (Site: Left Upper Arm, Position: Sitting, Cuff Size: Large Adult)   Pulse 79   Temp 98 °F (36.7 °C) (Temporal)   Resp 16   Ht 1.676 m (5' 6\")   Wt 85.3 kg (188 lb)   LMP 08/20/2024 (Approximate)   SpO2 97%   BMI 30.34 kg/m²    Patient reports taking medications as instructed. Yes   Medication side effects noted no  Headache upon wakening.no   Home BP monitoring: no  Do you experience chest pain/pressure or SOB with exertion? no  Maintain a low Sodium diet? Yes  Lab Results   Component Value Date/Time    BUN 19 09/18/2024 04:45 PM    CREATININE 1.0 09/18/2024 04:45 PM    LABGLOM >60.0 09/18/2024 04:45 PM    LABGLOM >60 07/19/2023 08:56 AM    LABGLOM >60 11/29/2022 08:48 AM    K 3.8 09/18/2024 04:45 PM     Hypertension Medications       Antihypertensive Combinations       losartan-hydroCHLOROthiazide (HYZAAR) 100-25 MG per tablet Take 1 tablet by

## 2025-03-10 DIAGNOSIS — I10 ESSENTIAL HYPERTENSION WITH GOAL BLOOD PRESSURE LESS THAN 140/90: ICD-10-CM

## 2025-03-10 RX ORDER — LOSARTAN POTASSIUM AND HYDROCHLOROTHIAZIDE 25; 100 MG/1; MG/1
1 TABLET ORAL DAILY
Qty: 90 TABLET | Refills: 0 | Status: SHIPPED | OUTPATIENT
Start: 2025-03-10

## 2025-03-26 ENCOUNTER — HOSPITAL ENCOUNTER (OUTPATIENT)
Facility: HOSPITAL | Age: 55
Setting detail: SPECIMEN
Discharge: HOME | End: 2025-03-29
Payer: COMMERCIAL

## 2025-03-26 DIAGNOSIS — I10 ESSENTIAL HYPERTENSION WITH GOAL BLOOD PRESSURE LESS THAN 140/90: ICD-10-CM

## 2025-03-26 DIAGNOSIS — E89.0 POSTOPERATIVE HYPOTHYROIDISM: ICD-10-CM

## 2025-03-26 LAB
ALBUMIN SERPL-MCNC: 3.6 G/DL (ref 3.4–5)
ALBUMIN/GLOB SERPL: 1.1 (ref 0.8–1.7)
ALP SERPL-CCNC: 52 U/L (ref 45–117)
ALT SERPL-CCNC: 23 U/L (ref 13–56)
ANION GAP SERPL CALC-SCNC: 7 MMOL/L (ref 3–18)
AST SERPL-CCNC: 24 U/L (ref 10–38)
BILIRUB SERPL-MCNC: 0.4 MG/DL (ref 0.2–1)
BUN SERPL-MCNC: 16 MG/DL (ref 7–18)
BUN/CREAT SERPL: 16 (ref 12–20)
CALCIUM SERPL-MCNC: 9.1 MG/DL (ref 8.5–10.1)
CHLORIDE SERPL-SCNC: 102 MMOL/L (ref 100–111)
CHOLEST SERPL-MCNC: 134 MG/DL
CO2 SERPL-SCNC: 26 MMOL/L (ref 21–32)
CREAT SERPL-MCNC: 0.98 MG/DL (ref 0.6–1.3)
ERYTHROCYTE [DISTWIDTH] IN BLOOD BY AUTOMATED COUNT: 13.6 % (ref 11.6–14.5)
GLOBULIN SER CALC-MCNC: 3.2 G/DL (ref 2–4)
GLUCOSE SERPL-MCNC: 100 MG/DL (ref 74–99)
HCT VFR BLD AUTO: 36.6 % (ref 35–45)
HDLC SERPL-MCNC: 73 MG/DL (ref 40–60)
HDLC SERPL: 1.8 (ref 0–5)
HGB BLD-MCNC: 11.8 G/DL (ref 12–16)
LDLC SERPL CALC-MCNC: 52 MG/DL (ref 0–100)
LIPID PANEL: ABNORMAL
MCH RBC QN AUTO: 31.7 PG (ref 24–34)
MCHC RBC AUTO-ENTMCNC: 32.2 G/DL (ref 31–37)
MCV RBC AUTO: 98.4 FL (ref 78–100)
NRBC # BLD: 0 K/UL (ref 0–0.01)
NRBC BLD-RTO: 0 PER 100 WBC
PLATELET # BLD AUTO: 318 K/UL (ref 135–420)
PMV BLD AUTO: 10.8 FL (ref 9.2–11.8)
POTASSIUM SERPL-SCNC: 3.9 MMOL/L (ref 3.5–5.5)
PROT SERPL-MCNC: 6.8 G/DL (ref 6.4–8.2)
RBC # BLD AUTO: 3.72 M/UL (ref 4.2–5.3)
SODIUM SERPL-SCNC: 135 MMOL/L (ref 136–145)
TRIGL SERPL-MCNC: 45 MG/DL
TSH SERPL DL<=0.05 MIU/L-ACNC: 0.2 UIU/ML (ref 0.36–3.74)
VLDLC SERPL CALC-MCNC: 9 MG/DL
WBC # BLD AUTO: 7.7 K/UL (ref 4.6–13.2)

## 2025-03-26 PROCEDURE — 36415 COLL VENOUS BLD VENIPUNCTURE: CPT

## 2025-03-26 PROCEDURE — 80053 COMPREHEN METABOLIC PANEL: CPT

## 2025-03-26 PROCEDURE — 84443 ASSAY THYROID STIM HORMONE: CPT

## 2025-03-26 PROCEDURE — 85027 COMPLETE CBC AUTOMATED: CPT

## 2025-03-26 PROCEDURE — 80061 LIPID PANEL: CPT

## 2025-03-30 SDOH — ECONOMIC STABILITY: FOOD INSECURITY: WITHIN THE PAST 12 MONTHS, THE FOOD YOU BOUGHT JUST DIDN'T LAST AND YOU DIDN'T HAVE MONEY TO GET MORE.: NEVER TRUE

## 2025-03-30 SDOH — ECONOMIC STABILITY: TRANSPORTATION INSECURITY
IN THE PAST 12 MONTHS, HAS THE LACK OF TRANSPORTATION KEPT YOU FROM MEDICAL APPOINTMENTS OR FROM GETTING MEDICATIONS?: NO

## 2025-03-30 SDOH — ECONOMIC STABILITY: FOOD INSECURITY: WITHIN THE PAST 12 MONTHS, YOU WORRIED THAT YOUR FOOD WOULD RUN OUT BEFORE YOU GOT MONEY TO BUY MORE.: NEVER TRUE

## 2025-03-30 SDOH — ECONOMIC STABILITY: INCOME INSECURITY: IN THE LAST 12 MONTHS, WAS THERE A TIME WHEN YOU WERE NOT ABLE TO PAY THE MORTGAGE OR RENT ON TIME?: NO

## 2025-03-30 SDOH — ECONOMIC STABILITY: TRANSPORTATION INSECURITY
IN THE PAST 12 MONTHS, HAS LACK OF TRANSPORTATION KEPT YOU FROM MEETINGS, WORK, OR FROM GETTING THINGS NEEDED FOR DAILY LIVING?: NO

## 2025-04-02 ENCOUNTER — OFFICE VISIT (OUTPATIENT)
Facility: CLINIC | Age: 55
End: 2025-04-02
Payer: COMMERCIAL

## 2025-04-02 VITALS
BODY MASS INDEX: 30.86 KG/M2 | OXYGEN SATURATION: 98 % | HEIGHT: 66 IN | SYSTOLIC BLOOD PRESSURE: 110 MMHG | HEART RATE: 85 BPM | RESPIRATION RATE: 16 BRPM | WEIGHT: 192 LBS | DIASTOLIC BLOOD PRESSURE: 76 MMHG | TEMPERATURE: 96.8 F

## 2025-04-02 DIAGNOSIS — R73.03 PREDIABETES: ICD-10-CM

## 2025-04-02 DIAGNOSIS — Z11.4 SCREENING FOR HIV (HUMAN IMMUNODEFICIENCY VIRUS): ICD-10-CM

## 2025-04-02 DIAGNOSIS — I10 ESSENTIAL HYPERTENSION WITH GOAL BLOOD PRESSURE LESS THAN 140/90: Primary | ICD-10-CM

## 2025-04-02 PROCEDURE — 3074F SYST BP LT 130 MM HG: CPT | Performed by: NURSE PRACTITIONER

## 2025-04-02 PROCEDURE — 3078F DIAST BP <80 MM HG: CPT | Performed by: NURSE PRACTITIONER

## 2025-04-02 PROCEDURE — 99214 OFFICE O/P EST MOD 30 MIN: CPT | Performed by: NURSE PRACTITIONER

## 2025-04-02 RX ORDER — FLUTICASONE PROPIONATE 110 UG/1
2 AEROSOL, METERED RESPIRATORY (INHALATION) 2 TIMES DAILY
Qty: 12 G | Refills: 1 | Status: SHIPPED | OUTPATIENT
Start: 2025-04-02

## 2025-04-02 RX ORDER — FERROUS SULFATE 325(65) MG
1 TABLET ORAL DAILY
Qty: 90 TABLET | Refills: 3 | Status: SHIPPED | OUTPATIENT
Start: 2025-04-02 | End: 2026-03-28

## 2025-04-02 RX ORDER — ALBUTEROL SULFATE 90 UG/1
1 INHALANT RESPIRATORY (INHALATION) EVERY 4 HOURS PRN
Qty: 18 G | Refills: 1 | Status: SHIPPED | OUTPATIENT
Start: 2025-04-02

## 2025-04-02 ASSESSMENT — PATIENT HEALTH QUESTIONNAIRE - PHQ9
SUM OF ALL RESPONSES TO PHQ QUESTIONS 1-9: 0
2. FEELING DOWN, DEPRESSED OR HOPELESS: NOT AT ALL
SUM OF ALL RESPONSES TO PHQ QUESTIONS 1-9: 0
SUM OF ALL RESPONSES TO PHQ QUESTIONS 1-9: 0
1. LITTLE INTEREST OR PLEASURE IN DOING THINGS: NOT AT ALL
DEPRESSION UNABLE TO ASSESS: FUNCTIONAL CAPACITY MOTIVATION LIMITS ACCURACY
SUM OF ALL RESPONSES TO PHQ QUESTIONS 1-9: 0

## 2025-04-02 NOTE — PROGRESS NOTES
Gracia Stevens is a 55 y.o. female (: 1970) presenting to address:    Chief Complaint   Patient presents with    Hypertension       Vitals:    25 0759   BP: 110/76   Pulse: 85   Resp: 16   Temp: 96.8 °F (36 °C)   SpO2: 98%       \"Have you been to the ER, urgent care clinic since your last visit?  Hospitalized since your last visit?\"    NO    “Have you seen or consulted any other health care providers outside of [facility] since your last visit?”    NO           
  Allergen Reactions    Codeine Nausea And Vomiting       Objective:  There were no vitals taken for this visit. There is no height or weight on file to calculate BMI.    Physical assessment  Physical Exam  Vitals and nursing note reviewed.   Constitutional:       Appearance: Normal appearance.   HENT:      Head: Normocephalic and atraumatic.   Eyes:      Pupils: Pupils are equal, round, and reactive to light.   Cardiovascular:      Rate and Rhythm: Normal rate and regular rhythm.      Heart sounds: Normal heart sounds. No murmur heard.     No friction rub. No gallop.   Pulmonary:      Effort: Pulmonary effort is normal. No respiratory distress.      Breath sounds: Normal breath sounds.   Neurological:      Mental Status: She is alert and oriented to person, place, and time.   Psychiatric:         Mood and Affect: Mood normal.         Behavior: Behavior normal.         Thought Content: Thought content normal.         Judgment: Judgment normal.                 I have discussed the diagnosis with the patient and the intended plan as seen in the above orders.  The patient has received an After-Visit Summary and questions were answered concerning future plans.     An After Visit Summary was printed and given to the patient.    All diagnosis have been discussed with the patient and all of the patient's questions have been answered.           Priscila Coyle, [facility]  [address] [id number]

## 2025-06-06 DIAGNOSIS — I10 ESSENTIAL HYPERTENSION WITH GOAL BLOOD PRESSURE LESS THAN 140/90: ICD-10-CM

## 2025-06-09 RX ORDER — LOSARTAN POTASSIUM AND HYDROCHLOROTHIAZIDE 25; 100 MG/1; MG/1
1 TABLET ORAL DAILY
Qty: 90 TABLET | Refills: 0 | Status: SHIPPED | OUTPATIENT
Start: 2025-06-09